# Patient Record
Sex: MALE | Race: WHITE | NOT HISPANIC OR LATINO | URBAN - METROPOLITAN AREA
[De-identification: names, ages, dates, MRNs, and addresses within clinical notes are randomized per-mention and may not be internally consistent; named-entity substitution may affect disease eponyms.]

---

## 2020-07-25 ENCOUNTER — TELEPHONE ENCOUNTER (OUTPATIENT)
Dept: URBAN - METROPOLITAN AREA CLINIC 13 | Facility: CLINIC | Age: 30
End: 2020-07-25

## 2020-07-25 RX ORDER — VARENICLINE TARTRATE 1 MG/1
TABLET, FILM COATED ORAL
Refills: 0 | OUTPATIENT
End: 2019-10-23

## 2020-07-25 RX ORDER — HYDROCORTISONE ACETATE 25 MG/1
SUPPOSITORY RECTAL
Refills: 0 | OUTPATIENT
End: 2019-10-23

## 2020-07-26 ENCOUNTER — TELEPHONE ENCOUNTER (OUTPATIENT)
Dept: URBAN - METROPOLITAN AREA CLINIC 13 | Facility: CLINIC | Age: 30
End: 2020-07-26

## 2020-07-26 RX ORDER — BISMUTH SUBSALICYLATE 262MG/15ML
TAKE 1 TABLET DAILY PRN SUSPENSION, ORAL (FINAL DOSE FORM) ORAL
Refills: 0 | Status: ACTIVE | COMMUNITY

## 2022-04-01 ENCOUNTER — OFFICE VISIT (OUTPATIENT)
Dept: URGENT CARE | Facility: CLINIC | Age: 32
End: 2022-04-01
Payer: COMMERCIAL

## 2022-04-01 ENCOUNTER — HOSPITAL ENCOUNTER (EMERGENCY)
Facility: HOSPITAL | Age: 32
Discharge: HOME | End: 2022-04-01
Attending: EMERGENCY MEDICINE
Payer: COMMERCIAL

## 2022-04-01 ENCOUNTER — APPOINTMENT (EMERGENCY)
Dept: CT IMAGING | Facility: HOSPITAL | Age: 32
End: 2022-04-01
Payer: COMMERCIAL

## 2022-04-01 VITALS
TEMPERATURE: 97.8 F | SYSTOLIC BLOOD PRESSURE: 114 MMHG | OXYGEN SATURATION: 99 % | HEIGHT: 66 IN | DIASTOLIC BLOOD PRESSURE: 68 MMHG | WEIGHT: 125 LBS | BODY MASS INDEX: 20.09 KG/M2 | RESPIRATION RATE: 18 BRPM | HEART RATE: 56 BPM

## 2022-04-01 VITALS
DIASTOLIC BLOOD PRESSURE: 77 MMHG | HEART RATE: 77 BPM | WEIGHT: 125.6 LBS | SYSTOLIC BLOOD PRESSURE: 116 MMHG | TEMPERATURE: 98 F | OXYGEN SATURATION: 98 % | RESPIRATION RATE: 20 BRPM

## 2022-04-01 DIAGNOSIS — R19.7 DIARRHEA OF PRESUMED INFECTIOUS ORIGIN: ICD-10-CM

## 2022-04-01 DIAGNOSIS — R19.7 DIARRHEA: Primary | ICD-10-CM

## 2022-04-01 DIAGNOSIS — R10.84 GENERALIZED ABDOMINAL PAIN: Primary | ICD-10-CM

## 2022-04-01 LAB
ALBUMIN SERPL BCP-MCNC: 3.6 G/DL (ref 3.5–5)
ALP SERPL-CCNC: 65 U/L (ref 46–116)
ALT SERPL W P-5'-P-CCNC: 26 U/L (ref 12–78)
ANION GAP SERPL CALCULATED.3IONS-SCNC: 5 MMOL/L (ref 4–13)
AST SERPL W P-5'-P-CCNC: 13 U/L (ref 5–45)
BASOPHILS # BLD AUTO: 0.02 THOUSANDS/ΜL (ref 0–0.1)
BASOPHILS NFR BLD AUTO: 1 % (ref 0–1)
BILIRUB SERPL-MCNC: 0.41 MG/DL (ref 0.2–1)
BUN SERPL-MCNC: 5 MG/DL (ref 5–25)
CALCIUM SERPL-MCNC: 8.5 MG/DL (ref 8.3–10.1)
CHLORIDE SERPL-SCNC: 103 MMOL/L (ref 100–108)
CO2 SERPL-SCNC: 30 MMOL/L (ref 21–32)
CREAT SERPL-MCNC: 0.94 MG/DL (ref 0.6–1.3)
CRP SERPL QL: 1.6 MG/L
EOSINOPHIL # BLD AUTO: 0.05 THOUSAND/ΜL (ref 0–0.61)
EOSINOPHIL NFR BLD AUTO: 1 % (ref 0–6)
ERYTHROCYTE [DISTWIDTH] IN BLOOD BY AUTOMATED COUNT: 12.8 % (ref 11.6–15.1)
GFR SERPL CREATININE-BSD FRML MDRD: 106 ML/MIN/1.73SQ M
GLUCOSE SERPL-MCNC: 96 MG/DL (ref 65–140)
HCT VFR BLD AUTO: 44.2 % (ref 36.5–49.3)
HGB BLD-MCNC: 14.9 G/DL (ref 12–17)
IMM GRANULOCYTES # BLD AUTO: 0.01 THOUSAND/UL (ref 0–0.2)
IMM GRANULOCYTES NFR BLD AUTO: 0 % (ref 0–2)
LACTATE SERPL-SCNC: 1 MMOL/L (ref 0.5–2)
LIPASE SERPL-CCNC: 31 U/L (ref 73–393)
LYMPHOCYTES # BLD AUTO: 1.51 THOUSANDS/ΜL (ref 0.6–4.47)
LYMPHOCYTES NFR BLD AUTO: 34 % (ref 14–44)
MAGNESIUM SERPL-MCNC: 1.9 MG/DL (ref 1.6–2.6)
MCH RBC QN AUTO: 29.4 PG (ref 26.8–34.3)
MCHC RBC AUTO-ENTMCNC: 33.7 G/DL (ref 31.4–37.4)
MCV RBC AUTO: 87 FL (ref 82–98)
MONOCYTES # BLD AUTO: 0.95 THOUSAND/ΜL (ref 0.17–1.22)
MONOCYTES NFR BLD AUTO: 22 % (ref 4–12)
NEUTROPHILS # BLD AUTO: 1.85 THOUSANDS/ΜL (ref 1.85–7.62)
NEUTS SEG NFR BLD AUTO: 42 % (ref 43–75)
NRBC BLD AUTO-RTO: 0 /100 WBCS
PLATELET # BLD AUTO: 270 THOUSANDS/UL (ref 149–390)
PMV BLD AUTO: 9.3 FL (ref 8.9–12.7)
POTASSIUM SERPL-SCNC: 4.4 MMOL/L (ref 3.5–5.3)
PROT SERPL-MCNC: 6.9 G/DL (ref 6.4–8.2)
RBC # BLD AUTO: 5.07 MILLION/UL (ref 3.88–5.62)
SODIUM SERPL-SCNC: 138 MMOL/L (ref 136–145)
WBC # BLD AUTO: 4.39 THOUSAND/UL (ref 4.31–10.16)

## 2022-04-01 PROCEDURE — 36415 COLL VENOUS BLD VENIPUNCTURE: CPT | Performed by: PHYSICIAN ASSISTANT

## 2022-04-01 PROCEDURE — 83690 ASSAY OF LIPASE: CPT | Performed by: PHYSICIAN ASSISTANT

## 2022-04-01 PROCEDURE — 99284 EMERGENCY DEPT VISIT MOD MDM: CPT

## 2022-04-01 PROCEDURE — 83605 ASSAY OF LACTIC ACID: CPT | Performed by: PHYSICIAN ASSISTANT

## 2022-04-01 PROCEDURE — 99284 EMERGENCY DEPT VISIT MOD MDM: CPT | Performed by: PHYSICIAN ASSISTANT

## 2022-04-01 PROCEDURE — 99203 OFFICE O/P NEW LOW 30 MIN: CPT | Performed by: NURSE PRACTITIONER

## 2022-04-01 PROCEDURE — 96365 THER/PROPH/DIAG IV INF INIT: CPT

## 2022-04-01 PROCEDURE — 86140 C-REACTIVE PROTEIN: CPT | Performed by: PHYSICIAN ASSISTANT

## 2022-04-01 PROCEDURE — 85025 COMPLETE CBC W/AUTO DIFF WBC: CPT | Performed by: PHYSICIAN ASSISTANT

## 2022-04-01 PROCEDURE — 96375 TX/PRO/DX INJ NEW DRUG ADDON: CPT

## 2022-04-01 PROCEDURE — 80053 COMPREHEN METABOLIC PANEL: CPT | Performed by: PHYSICIAN ASSISTANT

## 2022-04-01 PROCEDURE — 83735 ASSAY OF MAGNESIUM: CPT | Performed by: PHYSICIAN ASSISTANT

## 2022-04-01 RX ORDER — ONDANSETRON 2 MG/ML
4 INJECTION INTRAMUSCULAR; INTRAVENOUS ONCE
Status: COMPLETED | OUTPATIENT
Start: 2022-04-01 | End: 2022-04-01

## 2022-04-01 RX ORDER — KETOROLAC TROMETHAMINE 30 MG/ML
15 INJECTION, SOLUTION INTRAMUSCULAR; INTRAVENOUS ONCE
Status: COMPLETED | OUTPATIENT
Start: 2022-04-01 | End: 2022-04-01

## 2022-04-01 RX ORDER — DICYCLOMINE HCL 20 MG
20 TABLET ORAL ONCE
Status: COMPLETED | OUTPATIENT
Start: 2022-04-01 | End: 2022-04-01

## 2022-04-01 RX ADMIN — SODIUM CHLORIDE, SODIUM LACTATE, POTASSIUM CHLORIDE, AND CALCIUM CHLORIDE 2000 ML: .6; .31; .03; .02 INJECTION, SOLUTION INTRAVENOUS at 12:52

## 2022-04-01 RX ADMIN — ONDANSETRON 4 MG: 2 INJECTION INTRAMUSCULAR; INTRAVENOUS at 12:53

## 2022-04-01 RX ADMIN — DICYCLOMINE HYDROCHLORIDE 20 MG: 20 TABLET ORAL at 12:53

## 2022-04-01 RX ADMIN — KETOROLAC TROMETHAMINE 15 MG: 30 INJECTION, SOLUTION INTRAMUSCULAR at 12:53

## 2022-04-01 NOTE — PATIENT INSTRUCTIONS
You have been instructed to go to the ED for evaluation of your abdominal pain and diarrhea  Do not eat, drink, or urinate until you get to the ED  Follow up with your PCP

## 2022-04-01 NOTE — DISCHARGE INSTRUCTIONS
Rest, plenty of fluids  Clear liquids and advance as diet  Recommend daily probiotic such as culturelle, align, etc   OTC immodium as needed for diarrhea  Follow up with GI or return to ER as needed

## 2022-04-01 NOTE — PROGRESS NOTES
[facility]        NAME: Alirio Santos is a 27 y o  male  : 1990    MRN: [id number]  DATE: 2022  TIME: 11:27 AM    Assessment and Plan   Generalized abdominal pain [R10 84]  1  Generalized abdominal pain  Transfer to other facility   2  Diarrhea of presumed infectious origin  Transfer to other facility         Patient Instructions       Follow up with PCP in 3-5 days  Proceed to ER if symptoms worsen  You have been instructed to go to the ED for evaluation of your abdominal pain and diarrhea  Do not eat, drink, or urinate until you get to the ED  Follow up with your PCP  Chief Complaint     Chief Complaint   Patient presents with    Diarrhea    Abdominal Pain         History of Present Illness       This is a 27year old male with no personal history of Chron's, UC, or diverticulitis who presents with 1 week of abdominal pain and diarrhea  Believes it was from eating hotdogs last Friday  Reports bowel movements are liquid brown and dark red stool and frequent, "I lost count of how many times today " With associated chills, nausea, and weight loss  Denies fevers and vomiting  One void today and reports decreased number of voids throughout the last week  Yesterday ate bland chicken with potatoes only, no solid PO intake today  Is attempting to take extra vitamin supplements and is drinking water and Pedialyte  Pt states last time he had this he was in [place] and was given "steroids and antibiotics"  He denies having any testing done at that time  Pt told RN that he "lost 5 pounds in 1 month"       Review of Systems   Review of Systems   Constitutional: Positive for appetite change, chills and fatigue  Negative for fever  HENT: Negative  Eyes: Negative  Respiratory: Negative  Cardiovascular: Negative  Gastrointestinal: Positive for abdominal pain, blood in stool, diarrhea and nausea  Negative for abdominal distention and vomiting  Endocrine: Negative  Genitourinary: Positive for decreased urine volume  Musculoskeletal: Negative  Skin: Negative  Allergic/Immunologic: Negative  Neurological: Negative  Hematological: Negative  Psychiatric/Behavioral: Negative  Current Medications     No current outpatient medications on file  Current Allergies     Allergies as of 04/01/2022    (No Known Allergies)            The following portions of the patient's history were reviewed and updated as appropriate: allergies, current medications, past family history, past medical history, past social history, past surgical history and problem list      History reviewed  No pertinent past medical history  History reviewed  No pertinent surgical history  History reviewed  No pertinent family history  Medications have been verified  Objective   /77   Pulse 77   Temp 98 °F (36 7 °C)   Resp 20   Wt 57 kg (125 lb 9 6 oz)   SpO2 98%   No LMP for male patient  Physical Exam     Physical Exam  Vitals and nursing note reviewed  Constitutional:       Appearance: He is ill-appearing  He is not diaphoretic  Comments: Thin, frail appearing     HENT:      Head: Normocephalic and atraumatic  Eyes:      Extraocular Movements: Extraocular movements intact  Pupils: Pupils are equal, round, and reactive to light  Cardiovascular:      Rate and Rhythm: Normal rate and regular rhythm  Heart sounds: Normal heart sounds  No murmur heard  Pulmonary:      Effort: Pulmonary effort is normal  No respiratory distress  Breath sounds: Normal breath sounds  Abdominal:      General: Abdomen is flat  Bowel sounds are increased  There is no distension  Palpations: Abdomen is soft  There is no mass  Tenderness: There is generalized abdominal tenderness and tenderness in the left lower quadrant  Comments: Fluid palpated in LLQ    Skin:     General: Skin is warm and dry        Capillary Refill: Capillary refill takes 2 to 3 seconds  Coloration: Skin is pale  Neurological:      General: No focal deficit present  Mental Status: He is alert and oriented to person, place, and time  Psychiatric:         Mood and Affect: Mood normal          Behavior: Behavior normal  Behavior is cooperative

## 2022-04-01 NOTE — ED NOTES
Callbell within reach  Bed in low position  Siderails up  HOB elevated  Patient resting comfortably at this time        Riddhi Martínez, [facility]  04/01/22 3837

## 2022-04-01 NOTE — ED PROVIDER NOTES
History  Chief Complaint   Patient presents with    Diarrhea     patient reports one week of diarrhea and mid abdominal pain  drinking plenty of fluids at home     27year old otherwise healthy male presents ambulatory from local urgent care for evaluation  Pt reports symptoms started a week ago  Has been having generalized abdominal pain  He also reports numerous episode of watery diarrhea  Lost track of number of episodes  Recently noticed some bright red blood in the stool  Initially when symptoms started thought it was from hot dogs he ate  Symptoms not getting better  Reports chills, nausea  Denies vomiting  Appetite decreased  Has been trying to push fluids and pedialyte  Denies cough or congestion  No recent antibiotic use  Denies recent travel  Denies chest pain, SOB  Denies any urinary complaints but feels he is not urinating as much  No rashes reported  No reported aggravating or alleviating factors  No specific treatments tried  No prior abdominal surgeries  Was seen at urgent care earlier today and referred to us for further evaluation  He tells me he had something similar about 6 months ago while in [place] and was given antibiotics and steroids at that time with improvement of his symptoms  No testing was done at that time  He denies any history of Crohn's, ulcerative colitis or diverticulitis  History provided by:  Patient and medical records   used: No    Diarrhea  Timing:  Intermittent  Relieved by:  Nothing  Ineffective treatments:  None tried  Associated symptoms: abdominal pain, chills and myalgias    Associated symptoms: no fever, no headaches and no vomiting    Risk factors: no recent antibiotic use, no sick contacts, no suspicious food intake and no travel to endemic areas        None       History reviewed  No pertinent past medical history  History reviewed  No pertinent surgical history  History reviewed   No pertinent family history  I have reviewed and agree with the history as documented  E-Cigarette/Vaping    E-Cigarette Use Current Every Day User      E-Cigarette/Vaping Substances    Nicotine Yes      Social History     Tobacco Use    Smoking status: Current Some Day Smoker     Types: Cigarettes    Smokeless tobacco: Never Used   Vaping Use    Vaping Use: Every day    Substances: Nicotine   Substance Use Topics    Alcohol use: Never    Drug use: Never       Review of Systems   Constitutional: Positive for appetite change, chills and fatigue  Negative for fever  HENT: Negative  Negative for congestion, rhinorrhea and sore throat  Eyes: Negative  Negative for visual disturbance  Respiratory: Negative  Negative for cough, shortness of breath and wheezing  Cardiovascular: Negative  Negative for chest pain  Gastrointestinal: Positive for abdominal pain, blood in stool, diarrhea and nausea  Negative for constipation and vomiting  Genitourinary: Negative  Negative for dysuria, flank pain, frequency and hematuria  Musculoskeletal: Positive for myalgias  Negative for back pain and neck pain  Skin: Negative  Negative for rash  Neurological: Negative  Negative for dizziness, light-headedness and headaches  Psychiatric/Behavioral: Negative  All other systems reviewed and are negative  Physical Exam  Physical Exam  Vitals and nursing note reviewed  Constitutional:       General: He is not in acute distress  Appearance: Normal appearance  He is well-developed  He is not toxic-appearing or diaphoretic  HENT:      Head: Normocephalic and atraumatic  Right Ear: Hearing and external ear normal       Left Ear: Hearing and external ear normal       Nose: Nose normal       Mouth/Throat:      Mouth: Mucous membranes are moist       Tongue: Tongue does not deviate from midline  Pharynx: Oropharynx is clear  Uvula midline  Eyes:      General: Lids are normal  No scleral icterus  Extraocular Movements: Extraocular movements intact  Conjunctiva/sclera: Conjunctivae normal    Neck:      Trachea: Trachea and phonation normal  No tracheal deviation  Cardiovascular:      Rate and Rhythm: Normal rate and regular rhythm  Pulses: Normal pulses  Heart sounds: Normal heart sounds, S1 normal and S2 normal  No murmur heard  Pulmonary:      Effort: Pulmonary effort is normal  No tachypnea or respiratory distress  Breath sounds: Normal breath sounds  No wheezing, rhonchi or rales  Abdominal:      General: Bowel sounds are increased  There is no distension  Palpations: Abdomen is soft  Tenderness: There is no abdominal tenderness  There is no right CVA tenderness, left CVA tenderness, guarding or rebound  Hernia: No hernia is present  Musculoskeletal:         General: No tenderness  Normal range of motion  Right lower leg: No edema  Left lower leg: No edema  Skin:     General: Skin is warm and dry  Capillary Refill: Capillary refill takes less than 2 seconds  Findings: No rash  Neurological:      General: No focal deficit present  Mental Status: He is alert and oriented to person, place, and time  GCS: GCS eye subscore is 4  GCS verbal subscore is 5  GCS motor subscore is 6  Motor: No abnormal muscle tone        Gait: Gait normal    Psychiatric:         Mood and Affect: Mood normal          Speech: Speech normal          Behavior: Behavior normal          Vital Signs  ED Triage Vitals [04/01/22 1150]   Temperature Pulse Respirations Blood Pressure SpO2   97 8 °F (36 6 °C) 96 16 111/76 97 %      Temp Source Heart Rate Source Patient Position - Orthostatic VS BP Location FiO2 (%)   Temporal Monitor Sitting Left arm --      Pain Score       No Pain           Vitals:    04/01/22 1200 04/01/22 1230 04/01/22 1330 04/01/22 1400   BP: 118/76 113/73 125/70 114/68   Pulse: 93 58 59 56   Patient Position - Orthostatic VS:  Lying Lying Lying         Visual Acuity  Visual Acuity      Most Recent Value   L Pupil Size (mm) 3   R Pupil Size (mm) 3          ED Medications  Medications   ondansetron (ZOFRAN) injection 4 mg (4 mg Intravenous Given 4/1/22 1253)   ketorolac (TORADOL) injection 15 mg (15 mg Intravenous Given 4/1/22 1253)   dicyclomine (BENTYL) tablet 20 mg (20 mg Oral Given 4/1/22 1253)   lactated ringers bolus 2,000 mL (0 mL Intravenous Stopped 4/1/22 1352)       Diagnostic Studies  Results Reviewed     Procedure Component Value Units Date/Time    Lactic acid [[id number]]  (Normal) Collected: 04/01/22 1251    Lab Status: Final result Specimen: Blood from Arm, Right Updated: 04/01/22 1332     LACTIC ACID 1 0 mmol/L     Narrative:      Result may be elevated if tourniquet was used during collection      Lipase [[id number]]  (Abnormal) Collected: 04/01/22 1251    Lab Status: Final result Specimen: Blood from Arm, Right Updated: 04/01/22 1319     Lipase 31 u/L     Comprehensive metabolic panel [[id number]] Collected: 04/01/22 1251    Lab Status: Final result Specimen: Blood from Arm, Right Updated: 04/01/22 1319     Sodium 138 mmol/L      Potassium 4 4 mmol/L      Chloride 103 mmol/L      CO2 30 mmol/L      ANION GAP 5 mmol/L      BUN 5 mg/dL      Creatinine 0 94 mg/dL      Glucose 96 mg/dL      Calcium 8 5 mg/dL      AST 13 U/L      ALT 26 U/L      Alkaline Phosphatase 65 U/L      Total Protein 6 9 g/dL      Albumin 3 6 g/dL      Total Bilirubin 0 41 mg/dL      eGFR 106 ml/min/1 73sq m     Narrative:      Ede guidelines for Chronic Kidney Disease (CKD):     Stage 1 with normal or high GFR (GFR > 90 mL/min/1 73 square meters)    Stage 2 Mild CKD (GFR = 60-89 mL/min/1 73 square meters)    Stage 3A Moderate CKD (GFR = 45-59 mL/min/1 73 square meters)    Stage 3B Moderate CKD (GFR = 30-44 mL/min/1 73 square meters)    Stage 4 Severe CKD (GFR = 15-29 mL/min/1 73 square meters)    Stage 5 End Stage CKD (GFR <15 mL/min/1 73 square meters)  Note: GFR calculation is accurate only with a steady state creatinine    Magnesium [[id number]]  (Normal) Collected: 04/01/22 1251    Lab Status: Final result Specimen: Blood from Arm, Right Updated: 04/01/22 1319     Magnesium 1 9 mg/dL     C-reactive protein [[id number]]  (Normal) Collected: 04/01/22 1251    Lab Status: Final result Specimen: Blood from Arm, Right Updated: 04/01/22 1319     CRP 1 6 mg/L     Narrative:      Note: Unit of Measure change to mg/L at [facility] will show at 10 fold increase in CRP result to match network standards      CBC and differential [[id number]]  (Abnormal) Collected: 04/01/22 1251    Lab Status: Final result Specimen: Blood from Arm, Right Updated: 04/01/22 1306     WBC 4 39 Thousand/uL      RBC 5 07 Million/uL      Hemoglobin 14 9 g/dL      Hematocrit 44 2 %      MCV 87 fL      MCH 29 4 pg      MCHC 33 7 g/dL      RDW 12 8 %      MPV 9 3 fL      Platelets 352 Thousands/uL      nRBC 0 /100 WBCs      Neutrophils Relative 42 %      Immat GRANS % 0 %      Lymphocytes Relative 34 %      Monocytes Relative 22 %      Eosinophils Relative 1 %      Basophils Relative 1 %      Neutrophils Absolute 1 85 Thousands/µL      Immature Grans Absolute 0 01 Thousand/uL      Lymphocytes Absolute 1 51 Thousands/µL      Monocytes Absolute 0 95 Thousand/µL      Eosinophils Absolute 0 05 Thousand/µL      Basophils Absolute 0 02 Thousands/µL                  No orders to display              Procedures  Procedures         ED Course  ED Course as of 04/01/22 1618   Fri Apr 01, 2022   1311 WBC: 4 39   1311 Hemoglobin: 14 9   1311 Platelet Count: 981   1320 Glucose, Random: 96   1320 Creatinine: 0 94   1320 BUN: 5   1320 Sodium: 138   1320 Potassium: 4 4   1320 Chloride: 103   1320 CO2: 30   1320 Anion Gap: 5   1320 Calcium: 8 5   1320 AST: 13   1320 ALT: 26   1320 Alkaline Phosphatase: 65   1320 Total Protein: 6 9   1320 Albumin: 3 6   1320 TOTAL BILIRUBIN: 0 41   1320 eGFR: 106   1320 Lipase(!): 31   1320 Magnesium: 1 9   1320 C-REACTIVE PROTEIN: 1 6   1348 LACTIC ACID: 1 0   1355 Pt does not want CT scan due to cost concerns  Reviewed labs  Vitals stable  No significant abdominal tenderness  Pt would like to be discharged and forgo CT scanning  This does not appear unreasonable  Will discharge with outpatient GI follow up  Pt afebrile, hemodynamically stable  No significant abdominal tenderness  labs unremarkable  He is tolerating PO  Pt wished to defer any abdominal imaging  Discussed symptoms could be potentially be related to a GI virus vs food borne illness  However given recurrent nature of symptoms, would recommend outpatient follow up with GI  Advised to push plenty of fluids  Recommend an electrolyte solution such as gatorade or pedialyte  BRAT (bananas, rice, apples, toast) diet and advance as tolerated  OTC tylenol and ibuprofen as needed for fever/discomfort  Recommended daily probiotic  OTC immodium as needed for diarrhea  Strict return precautions outlined  Advised outpatient follow up with PCP and GI or return to ER for change in condition as outlined  Pt verbalized understanding and had no further questions  SBIRT 20yo+      Most Recent Value   SBIRT (22 yo +)    In order to provide better care to our patients, we are screening all of our patients for alcohol and drug use  Would it be okay to ask you these screening questions? Yes Filed at: 04/01/2022 1212   Initial Alcohol Screen: US AUDIT-C     1  How often do you have a drink containing alcohol? 0 Filed at: 04/01/2022 1212   2  How many drinks containing alcohol do you have on a typical day you are drinking? 0 Filed at: 04/01/2022 1212   3a  Male UNDER 65: How often do you have five or more drinks on one occasion? 0 Filed at: 04/01/2022 1212   3b  FEMALE Any Age, or MALE 65+: How often do you have 4 or more drinks on one occassion?  0 Filed at: 04/01/2022 1212 Audit-C Score 0 Filed at: 04/01/2022 1212   SURESH: How many times in the past year have you    Used an illegal drug or used a prescription medication for non-medical reasons? Never Filed at: 04/01/2022 1212                    [facility]  Number of Diagnoses or Management Options  Diarrhea: new and requires workup     Amount and/or Complexity of Data Reviewed  Clinical lab tests: ordered and reviewed  Tests in the radiology section of CPT®: (Pt refused)  Decide to obtain previous medical records or to obtain history from someone other than the patient: yes  Obtain history from someone other than the patient: yes  Review and summarize past medical records: yes    Patient Progress  Patient progress: improved      Disposition  Final diagnoses:   Diarrhea     Time reflects when diagnosis was documented in both MDM as applicable and the Disposition within this note     Time User Action Codes Description Comment    4/1/2022  2:01 PM Igor Good Add [R19 7] Diarrhea       ED Disposition     ED Disposition Condition Date/Time Comment    Discharge Stable Fri Apr 1, 2022  2:01 PM Shay Hannah discharge to home/self care  Follow-up Information     Follow up With Specialties Details Why Contact Info Additional [facility] Schedule an appointment as soon as possible for a visit   [address] [id number]  Kiko Kelly [facility] [address], [place], [place], [address] [facility] Emergency Medicine  As needed [phone]  [facility] Department, [address], [address]          There are no discharge medications for this patient  No discharge procedures on file      PDMP Review     None          ED Provider  Electronically Signed by           Jas Ruiz PA-C  04/01/22 0474

## 2022-04-11 RX ORDER — DICYCLOMINE HYDROCHLORIDE 10 MG/1
10 CAPSULE ORAL
Qty: 20 CAPSULE | Refills: 0 | Status: SHIPPED | OUTPATIENT
Start: 2022-04-11 | End: 2022-07-21

## 2022-04-20 ENCOUNTER — TELEPHONE (OUTPATIENT)
Dept: SURGERY | Facility: CLINIC | Age: 32
End: 2022-04-20

## 2022-04-20 NOTE — TELEPHONE ENCOUNTER
Patient came into office requesting an appointment for persistent diarrhea, moved his appointment up from 5/20/2022 to tomorrow  Patient is aware of this  Advised patient that if symptoms worsen he should go to the ED today and not wait until appointment tomorrow  He wanted to know if we could order testing, advised him unfortunately since he is not established with our office we can't order until he is seen for an evaluation  He verbalized understanding and had no more questions at this time

## 2022-04-21 ENCOUNTER — OFFICE VISIT (OUTPATIENT)
Dept: GASTROENTEROLOGY | Facility: CLINIC | Age: 32
End: 2022-04-21
Payer: COMMERCIAL

## 2022-04-21 VITALS
TEMPERATURE: 98.2 F | RESPIRATION RATE: 20 BRPM | OXYGEN SATURATION: 98 % | WEIGHT: 124 LBS | HEART RATE: 98 BPM | DIASTOLIC BLOOD PRESSURE: 68 MMHG | BODY MASS INDEX: 19.93 KG/M2 | HEIGHT: 66 IN | SYSTOLIC BLOOD PRESSURE: 122 MMHG

## 2022-04-21 DIAGNOSIS — R10.84 GENERALIZED ABDOMINAL PAIN: ICD-10-CM

## 2022-04-21 DIAGNOSIS — R19.7 DIARRHEA, UNSPECIFIED TYPE: Primary | ICD-10-CM

## 2022-04-21 DIAGNOSIS — K62.5 RECTAL BLEEDING: ICD-10-CM

## 2022-04-21 PROCEDURE — 99244 OFF/OP CNSLTJ NEW/EST MOD 40: CPT | Performed by: FAMILY MEDICINE

## 2022-04-21 NOTE — PROGRESS NOTES
[facility] - Outpatient Consultation  Loly Strickland 27 y o  male MRN: [id number]  Encounter: [id number]          ASSESSMENT AND PLAN:      1  Generalized abdominal pain  2  Diarrhea, unspecified type  3  Rectal bleeding  Patient seen in ED on 04/01/2022 for generalized abdominal pain and diarrhea  Workup, physical exam, and vitals were unremarkable and patient refused abdominal imaging due to cost with being self-pay  Has since had progressively worsening diarrhea, now associated with rectal bleeding described as bright red blood/clots and an 8 lb weight loss x1 month - Although, per chart review weight has remained stable  Discussed multiple etiologies for symptoms including infectious colitis, autoimmune colitis, celiac disease, infection with H pylori, thyroid abnormalities, or less likely malignancy - Ordered serologies and stool studies as referenced below, and recommended he have these completed as soon as possible - If labs are too costly recommended prioritizing infectious studies  Less suspicious for diverticulitis given chronicity of symptoms, generalized abd pain, diarrhea with rectal bleeding, previously normal WBC count, and remaining afebrile/non-toxic appearing  Discussed abdominal imaging vs colonoscopy for further evaluation given patient being self-pay - Ultimately recommended colonoscopy, particularly for rectal bleeding, however this may be difficult to schedule due to financial restraints  He has been provided information regarding financial assistance through [facility]  Discussed risks of procedure with patient including, but not limited to, bleeding, infection, and perforation; Patient gave verbal understanding and is agreeable to proceed  Discussed and given instructions for bowel prep as ordered - MiraLax/Dulcolax      If lab/stool studies are unremarkable, would consider prescription for Lomotil given no improvement in symptoms with OTC antidiarrheals but would prefer patient undergo colonoscopy to r/o autoimmune colitis prior to taking antidiarrheals  - Calprotectin,Fecal; Future  - Clostridium difficile toxin by PCR with EIA; Future  - Giardia antigen; Future  - H  pylori antigen, stool; Future  - Ova and parasite examination; Future  - IgA; Future  - Tissue transglutaminase, IgA; Future  - Stool Enteric Bacterial Panel by PCR; Future  - TSH w/Reflex; Future  - CBC and differential; Future  - Comprehensive metabolic panel; Future  - Colonoscopy; Future    Follow-up after procedure to discuss results  ______________________________________________________________________    HPI: Patient is a 27 y o  male without significant PMH who presents today for a consultation regarding diarrhea  Referred after d/c from the ED  Patient was initially seen in the urgent care on 04/01/2022 for generalized abdominal pain and diarrhea  Patient was advised to proceed to the ED for further evaluation  Labs including CBC, CMP, lipase, magnesium, CRP, lactic acid were entirely unremarkable  Patient refused abdominal imaging, due to cost  Patient was advised that symptoms may be due to gastroenteritis versus food-borne illness, and was successfully discharged with recommendations to follow-up with Gastroenterology  Today, patient states that sxs's started with acute onset of diarrhea one month prior  States his diarrhea progressed, and is now having a BM every 3 hours throughout the night and occasionally throughout the day  Described his stools as liquid brown stools with passage of both bright red blood and clots  Describes abdominal pain as generalized abdominal cramping prior to, and improved with, bowel movements  Admits to seeing blood with every BM  Also admits to unintentional weight loss of 8 lbs of the last month - Although per chart review patient's weight appears stable since 4/1  Has tried Imodium without any improvement   Denies objective fever/chills, nausea/vomting, or constipation  Denies family history of autoimmune colitis  Denies recent travel outside the country, recent antibiotic use, eating undercooked/poorly prepared foods, or sick contacts  REVIEW OF SYSTEMS:    CONSTITUTIONAL: Denies any fever, chills, rigors, and weight loss  HEENT: No earache or tinnitus  Denies hearing loss or visual disturbances  CARDIOVASCULAR: No chest pain or palpitations  RESPIRATORY: Denies any cough, hemoptysis, shortness of breath or dyspnea on exertion  GASTROINTESTINAL: As noted in the History of Present Illness  GENITOURINARY: No problems with urination  Denies any hematuria or dysuria  NEUROLOGIC: No dizziness or vertigo, denies headaches  MUSCULOSKELETAL: Denies any muscle or joint pain  SKIN: Denies skin rashes or itching  ENDOCRINE: Denies excessive thirst  Denies intolerance to heat or cold  PSYCHOSOCIAL: Denies depression or anxiety  Denies any recent memory loss  Historical Information   No past medical history on file  No past surgical history on file  Social History   Social History     Substance and Sexual Activity   Alcohol Use Never     Social History     Substance and Sexual Activity   Drug Use Never     Social History     Tobacco Use   Smoking Status Current Some Day Smoker    Types: Cigarettes   Smokeless Tobacco Never Used     No family history on file  Meds/Allergies       Current Outpatient Medications:     dicyclomine (BENTYL) 10 mg capsule    No Known Allergies        Objective     Blood pressure 122/68, pulse 98, temperature 98 2 °F (36 8 °C), resp  rate 20, height 5' 6" (1 676 m), weight 56 2 kg (124 lb), SpO2 98 %  Body mass index is 20 01 kg/m²  PHYSICAL EXAM:      General Appearance:   Alert, cooperative, no distress   HEENT:   Normocephalic, atraumatic, anicteric       Neck:  Supple, symmetrical, trachea midline   Lungs:   Clear to auscultation bilaterally; no rales, rhonchi or wheezing; respirations unlabored Heart[de-identified]   Regular rate and rhythm; no murmur, rub, or gallop  Abdomen:   Soft, +generalized abdominal TTP, non-distended; normal bowel sounds; no masses, no organomegaly    Genitalia:   Deferred    Rectal:   Deferred    Extremities:  No cyanosis, clubbing or edema    Pulses:  2+ and symmetric    Skin:  No jaundice, rashes, or lesions    Lymph nodes:  No palpable cervical lymphadenopathy        Lab Results:   No visits with results within 1 Day(s) from this visit     Latest known visit with results is:   Admission on 04/01/2022, Discharged on 04/01/2022   Component Date Value    WBC 04/01/2022 4 39     RBC 04/01/2022 5 07     Hemoglobin 04/01/2022 14 9     Hematocrit 04/01/2022 44 2     MCV 04/01/2022 87     MCH 04/01/2022 29 4     MCHC 04/01/2022 33 7     RDW 04/01/2022 12 8     MPV 04/01/2022 9 3     Platelets 48/76/1812 270     nRBC 04/01/2022 0     Neutrophils Relative 04/01/2022 42*    Immat GRANS % 04/01/2022 0     Lymphocytes Relative 04/01/2022 34     Monocytes Relative 04/01/2022 22*    Eosinophils Relative 04/01/2022 1     Basophils Relative 04/01/2022 1     Neutrophils Absolute 04/01/2022 1 85     Immature Grans Absolute 04/01/2022 0 01     Lymphocytes Absolute 04/01/2022 1 51     Monocytes Absolute 04/01/2022 0 95     Eosinophils Absolute 04/01/2022 0 05     Basophils Absolute 04/01/2022 0 02     Lipase 04/01/2022 31*    Sodium 04/01/2022 138     Potassium 04/01/2022 4 4     Chloride 04/01/2022 103     CO2 04/01/2022 30     ANION GAP 04/01/2022 5     BUN 04/01/2022 5     Creatinine 04/01/2022 0 94     Glucose 04/01/2022 96     Calcium 04/01/2022 8 5     AST 04/01/2022 13     ALT 04/01/2022 26     Alkaline Phosphatase 04/01/2022 65     Total Protein 04/01/2022 6 9     Albumin 04/01/2022 3 6     Total Bilirubin 04/01/2022 0 41     eGFR 04/01/2022 106     Magnesium 04/01/2022 1 9     CRP 04/01/2022 1 6     LACTIC ACID 04/01/2022 1 0          Radiology Results: No results found

## 2022-04-22 ENCOUNTER — APPOINTMENT (OUTPATIENT)
Dept: LAB | Facility: CLINIC | Age: 32
End: 2022-04-22

## 2022-04-22 DIAGNOSIS — R19.7 DIARRHEA, UNSPECIFIED TYPE: ICD-10-CM

## 2022-04-22 DIAGNOSIS — K62.5 RECTAL BLEEDING: ICD-10-CM

## 2022-04-22 LAB
ALBUMIN SERPL BCP-MCNC: 2.6 G/DL (ref 3.5–5)
ALP SERPL-CCNC: 64 U/L (ref 46–116)
ALT SERPL W P-5'-P-CCNC: 36 U/L (ref 12–78)
ANION GAP SERPL CALCULATED.3IONS-SCNC: 2 MMOL/L (ref 4–13)
AST SERPL W P-5'-P-CCNC: 20 U/L (ref 5–45)
BASOPHILS # BLD AUTO: 0.12 THOUSANDS/ΜL (ref 0–0.1)
BASOPHILS NFR BLD AUTO: 1 % (ref 0–1)
BILIRUB SERPL-MCNC: 0.26 MG/DL (ref 0.2–1)
BUN SERPL-MCNC: 8 MG/DL (ref 5–25)
CALCIUM ALBUM COR SERPL-MCNC: 9.5 MG/DL (ref 8.3–10.1)
CALCIUM SERPL-MCNC: 8.4 MG/DL (ref 8.3–10.1)
CHLORIDE SERPL-SCNC: 107 MMOL/L (ref 100–108)
CO2 SERPL-SCNC: 30 MMOL/L (ref 21–32)
CREAT SERPL-MCNC: 0.85 MG/DL (ref 0.6–1.3)
EOSINOPHIL # BLD AUTO: 1.41 THOUSAND/ΜL (ref 0–0.61)
EOSINOPHIL NFR BLD AUTO: 10 % (ref 0–6)
ERYTHROCYTE [DISTWIDTH] IN BLOOD BY AUTOMATED COUNT: 13.9 % (ref 11.6–15.1)
GFR SERPL CREATININE-BSD FRML MDRD: 115 ML/MIN/1.73SQ M
GLUCOSE P FAST SERPL-MCNC: 91 MG/DL (ref 65–99)
HCT VFR BLD AUTO: 34.7 % (ref 36.5–49.3)
HGB BLD-MCNC: 11 G/DL (ref 12–17)
IGA SERPL-MCNC: 153 MG/DL (ref 70–400)
IMM GRANULOCYTES # BLD AUTO: 0.2 THOUSAND/UL (ref 0–0.2)
IMM GRANULOCYTES NFR BLD AUTO: 1 % (ref 0–2)
LYMPHOCYTES # BLD AUTO: 2.07 THOUSANDS/ΜL (ref 0.6–4.47)
LYMPHOCYTES NFR BLD AUTO: 15 % (ref 14–44)
MCH RBC QN AUTO: 28.4 PG (ref 26.8–34.3)
MCHC RBC AUTO-ENTMCNC: 31.7 G/DL (ref 31.4–37.4)
MCV RBC AUTO: 90 FL (ref 82–98)
MONOCYTES # BLD AUTO: 1.37 THOUSAND/ΜL (ref 0.17–1.22)
MONOCYTES NFR BLD AUTO: 10 % (ref 4–12)
NEUTROPHILS # BLD AUTO: 8.84 THOUSANDS/ΜL (ref 1.85–7.62)
NEUTS SEG NFR BLD AUTO: 63 % (ref 43–75)
NRBC BLD AUTO-RTO: 0 /100 WBCS
PLATELET # BLD AUTO: 567 THOUSANDS/UL (ref 149–390)
PMV BLD AUTO: 8.7 FL (ref 8.9–12.7)
POTASSIUM SERPL-SCNC: 4.3 MMOL/L (ref 3.5–5.3)
PROT SERPL-MCNC: 5.9 G/DL (ref 6.4–8.2)
RBC # BLD AUTO: 3.87 MILLION/UL (ref 3.88–5.62)
SODIUM SERPL-SCNC: 139 MMOL/L (ref 136–145)
TSH SERPL DL<=0.05 MIU/L-ACNC: 1.46 UIU/ML (ref 0.45–4.5)
WBC # BLD AUTO: 14.01 THOUSAND/UL (ref 4.31–10.16)

## 2022-04-22 PROCEDURE — 85025 COMPLETE CBC W/AUTO DIFF WBC: CPT

## 2022-04-22 PROCEDURE — 86364 TISS TRNSGLTMNASE EA IG CLAS: CPT

## 2022-04-22 PROCEDURE — 84443 ASSAY THYROID STIM HORMONE: CPT

## 2022-04-22 PROCEDURE — 80053 COMPREHEN METABOLIC PANEL: CPT

## 2022-04-22 PROCEDURE — 36415 COLL VENOUS BLD VENIPUNCTURE: CPT

## 2022-04-22 PROCEDURE — 82784 ASSAY IGA/IGD/IGG/IGM EACH: CPT

## 2022-04-23 LAB — TTG IGA SER-ACNC: <2 U/ML (ref 0–3)

## 2022-04-27 ENCOUNTER — TELEPHONE (OUTPATIENT)
Dept: OTHER | Facility: OTHER | Age: 32
End: 2022-04-27

## 2022-04-27 NOTE — TELEPHONE ENCOUNTER
Patient called regarding his results of his blood work  He has been waiting for a call from provider      Please give him a call back

## 2022-04-27 NOTE — TELEPHONE ENCOUNTER
Pt called in again regarding his results  He says he has his phone in his hand now  He is requesting a call back

## 2022-04-27 NOTE — TELEPHONE ENCOUNTER
I spoke with pt regarding blood work  Discussed hbg decreased but possibly dehydration but electrolytes WNL's  He states bleeding has stopped but diarrhea continues  He states he will drop off stool studies tomorrow  He would like to schedule the colonoscopy & if things change will cancel    Pancho Rodriguez

## 2022-04-28 ENCOUNTER — APPOINTMENT (OUTPATIENT)
Dept: LAB | Facility: CLINIC | Age: 32
End: 2022-04-28

## 2022-04-28 PROCEDURE — 87505 NFCT AGENT DETECTION GI: CPT

## 2022-04-28 PROCEDURE — 87493 C DIFF AMPLIFIED PROBE: CPT

## 2022-04-28 PROCEDURE — 87177 OVA AND PARASITES SMEARS: CPT

## 2022-04-28 PROCEDURE — 83993 ASSAY FOR CALPROTECTIN FECAL: CPT

## 2022-04-28 PROCEDURE — 87338 HPYLORI STOOL AG IA: CPT

## 2022-04-28 PROCEDURE — 87209 SMEAR COMPLEX STAIN: CPT

## 2022-04-28 PROCEDURE — 87329 GIARDIA AG IA: CPT

## 2022-04-29 ENCOUNTER — TELEPHONE (OUTPATIENT)
Dept: GASTROENTEROLOGY | Facility: CLINIC | Age: 32
End: 2022-04-29

## 2022-04-29 LAB
C DIFF TOX GENS STL QL NAA+PROBE: NEGATIVE
CAMPYLOBACTER DNA SPEC NAA+PROBE: NORMAL
G LAMBLIA AG STL QL IA: NEGATIVE
H PYLORI AG STL QL IA: POSITIVE
SALMONELLA DNA SPEC QL NAA+PROBE: NORMAL
SHIGA TOXIN STX GENE SPEC NAA+PROBE: NORMAL
SHIGELLA DNA SPEC QL NAA+PROBE: NORMAL

## 2022-04-29 NOTE — TELEPHONE ENCOUNTER
Patient was called same day (4/27) by the GI PA on [employer], Crystal Birch, and made aware of his results  Remainder of patient's labs including stool studies are collected and in process  Will call patient once resulted

## 2022-04-29 NOTE — TELEPHONE ENCOUNTER
Regarding: Question regarding Comprehensive Metabolic Panel  ----- Message from Odalys Lindo RN sent at 4/27/2022  9:32 AM EDT -----       ----- Message from Radha Wilkins to Dylan Davidson PA-C sent at 4/27/2022  9:31 AM -----   Will I be receiving a phone call in regards to these results?

## 2022-04-30 ENCOUNTER — NURSE TRIAGE (OUTPATIENT)
Dept: OTHER | Facility: OTHER | Age: 32
End: 2022-04-30

## 2022-04-30 ENCOUNTER — TELEPHONE (OUTPATIENT)
Dept: GASTROENTEROLOGY | Facility: AMBULARY SURGERY CENTER | Age: 32
End: 2022-04-30

## 2022-04-30 NOTE — TELEPHONE ENCOUNTER
Regarding: H pylori / antibiotics request   ----- Message from [facility] sent at 4/30/2022 12:27 PM EDT -----  "I came back positive for H pylori and need antibiotics prescription"

## 2022-04-30 NOTE — TELEPHONE ENCOUNTER
Reason for Disposition   [1] Caller requesting NON-URGENT health information AND [2] PCP's office is the best resource    Answer Assessment - Initial Assessment Questions  1  REASON FOR CALL or QUESTION: "What is your reason for calling today?" or "How can I best help you?" or "What question do you have that I can help answer?"      Patient saw he was positive for H  Pylori and would like antibiotics  Protocols used: INFORMATION ONLY CALL - NO TRIAGE-ADULT-    Paged on call gastro provider  Provider will call patient directly to discuss

## 2022-04-30 NOTE — TELEPHONE ENCOUNTER
Reason for Disposition  Chaz Hawkins Caller has already spoken with another triager or PCP AND has further questions AND triager able to answer questions      Protocols used: NO CONTACT OR DUPLICATE CONTACT CALL-ADULT-

## 2022-04-30 NOTE — TELEPHONE ENCOUNTER
Called and spoke to patient regarding H pylori results NSAID over triple therapy  Patient was extremely enthusiastically to obtain this treatment as he believes it would be a cure to his diarrhea  After further chart review, it appears patient's hemoglobin has dropped significantly within the past few weeks as well as his albumin decreasing  Concern for underlying possible inflammatory process in the setting of his persistent diarrhea, blood in the stool, weight loss  He does report weakness and I urged patient to go to the emergency room however patient does not have insurance therefore he would not like to go yet  Fecal calprotectin is still pending  I discussed that if patient was admitted to the hospital he could undergo colonoscopy sooner as he reports his symptoms are very significant and is having bowel movements at least several times up to 10 a day  Again urged patient to go to the emergency room however he will hold off at this time  Patient however is agreeable to go if his symptoms become any worse

## 2022-04-30 NOTE — TELEPHONE ENCOUNTER
Regarding: H pylori / antibiotics request   ----- Message from [facility] sent at 4/30/2022  4:01 PM EDT -----  "I came back positive for H pylori and need antibiotics prescription" (second call hub, patient missed call from nurse)

## 2022-05-01 ENCOUNTER — TELEPHONE (OUTPATIENT)
Dept: OTHER | Facility: OTHER | Age: 32
End: 2022-05-01

## 2022-05-01 LAB — CALPROTECTIN STL-MCNT: 1092 UG/G (ref 0–120)

## 2022-05-02 NOTE — TELEPHONE ENCOUNTER
I returned this call to the patient and left a message on his VM that he had already spoken to James Mario PA-C yesterday and to please follow her care advise or he can call the office in the morning with further questions

## 2022-05-02 NOTE — TELEPHONE ENCOUNTER
Patient would like a call to discuss the results of his stool testing, he just wants to know if the results are something alarming   Please advise

## 2022-05-03 ENCOUNTER — TELEPHONE (OUTPATIENT)
Dept: GASTROENTEROLOGY | Facility: CLINIC | Age: 32
End: 2022-05-03

## 2022-05-04 ENCOUNTER — TELEPHONE (OUTPATIENT)
Dept: OTHER | Facility: OTHER | Age: 32
End: 2022-05-04

## 2022-05-09 ENCOUNTER — ANESTHESIA EVENT (OUTPATIENT)
Dept: GASTROENTEROLOGY | Facility: HOSPITAL | Age: 32
End: 2022-05-09

## 2022-05-09 ENCOUNTER — HOSPITAL ENCOUNTER (OUTPATIENT)
Dept: GASTROENTEROLOGY | Facility: HOSPITAL | Age: 32
Setting detail: OUTPATIENT SURGERY
Discharge: HOME | End: 2022-05-09
Payer: COMMERCIAL

## 2022-05-09 ENCOUNTER — ANESTHESIA (OUTPATIENT)
Dept: GASTROENTEROLOGY | Facility: HOSPITAL | Age: 32
End: 2022-05-09

## 2022-05-09 VITALS
BODY MASS INDEX: 19.93 KG/M2 | TEMPERATURE: 98.8 F | DIASTOLIC BLOOD PRESSURE: 72 MMHG | HEART RATE: 76 BPM | HEIGHT: 66 IN | OXYGEN SATURATION: 100 % | RESPIRATION RATE: 18 BRPM | WEIGHT: 124 LBS | SYSTOLIC BLOOD PRESSURE: 118 MMHG

## 2022-05-09 DIAGNOSIS — K62.5 RECTAL BLEEDING: ICD-10-CM

## 2022-05-09 DIAGNOSIS — R19.7 DIARRHEA, UNSPECIFIED TYPE: ICD-10-CM

## 2022-05-09 DIAGNOSIS — R10.84 GENERALIZED ABDOMINAL PAIN: ICD-10-CM

## 2022-05-09 DIAGNOSIS — K51.00 UNIVERSAL ULCERATIVE COLITIS WITHOUT COMPLICATION (HCC): Primary | ICD-10-CM

## 2022-05-09 PROBLEM — K21.9 GASTROESOPHAGEAL REFLUX DISEASE: Status: ACTIVE | Noted: 2022-05-09

## 2022-05-09 PROBLEM — F17.200 SMOKING: Status: ACTIVE | Noted: 2022-05-09

## 2022-05-09 PROCEDURE — 45380 COLONOSCOPY AND BIOPSY: CPT | Performed by: INTERNAL MEDICINE

## 2022-05-09 PROCEDURE — 88305 TISSUE EXAM BY PATHOLOGIST: CPT | Performed by: PATHOLOGY

## 2022-05-09 RX ORDER — PROPOFOL 10 MG/ML
INJECTION, EMULSION INTRAVENOUS AS NEEDED
Status: DISCONTINUED | OUTPATIENT
Start: 2022-05-09 | End: 2022-05-09

## 2022-05-09 RX ORDER — SODIUM CHLORIDE, SODIUM LACTATE, POTASSIUM CHLORIDE, CALCIUM CHLORIDE 600; 310; 30; 20 MG/100ML; MG/100ML; MG/100ML; MG/100ML
75 INJECTION, SOLUTION INTRAVENOUS CONTINUOUS
Status: DISCONTINUED | OUTPATIENT
Start: 2022-05-09 | End: 2022-05-13 | Stop reason: HOSPADM

## 2022-05-09 RX ORDER — SULFASALAZINE 500 MG/1
500 TABLET, DELAYED RELEASE ORAL 4 TIMES DAILY
Qty: 120 TABLET | Refills: 12 | Status: SHIPPED | OUTPATIENT
Start: 2022-05-09 | End: 2022-07-21

## 2022-05-09 RX ORDER — PREDNISONE 20 MG/1
TABLET ORAL
Qty: 90 TABLET | Refills: 0 | Status: SHIPPED | OUTPATIENT
Start: 2022-05-09 | End: 2022-06-17

## 2022-05-09 RX ORDER — MESALAMINE 1.2 G/1
1200 TABLET, DELAYED RELEASE ORAL
Qty: 60 TABLET | Refills: 12 | Status: SHIPPED | OUTPATIENT
Start: 2022-05-09 | End: 2022-06-29

## 2022-05-09 RX ADMIN — PROPOFOL 130 MG: 10 INJECTION, EMULSION INTRAVENOUS at 11:21

## 2022-05-09 RX ADMIN — PROPOFOL 30 MG: 10 INJECTION, EMULSION INTRAVENOUS at 11:26

## 2022-05-09 RX ADMIN — SODIUM CHLORIDE, SODIUM LACTATE, POTASSIUM CHLORIDE, AND CALCIUM CHLORIDE 75 ML/HR: .6; .31; .03; .02 INJECTION, SOLUTION INTRAVENOUS at 10:30

## 2022-05-09 NOTE — ANESTHESIA PREPROCEDURE EVALUATION
Procedure:  COLONOSCOPY    Relevant Problems   GI/HEPATIC   (+) Gastroesophageal reflux disease      PULMONARY   (+) Smoking        Physical Exam    Airway    Mallampati score: II  TM Distance: >3 FB  Neck ROM: full     Dental   No notable dental hx     Cardiovascular      Pulmonary      Other Findings        Anesthesia Plan  ASA Score- 2     Anesthesia Type- IV sedation with anesthesia with ASA Monitors  Additional Monitors:   Airway Plan:     Comment: Back up GA  Plan Factors-Exercise tolerance (METS): >4 METS  Chart reviewed  Existing labs reviewed  Patient summary reviewed  Patient is a current smoker  Induction-     Postoperative Plan-     Informed Consent- Anesthetic plan and risks discussed with patient  I personally reviewed this patient with the CRNA  Discussed and agreed on the Anesthesia Plan with the CRNA  Oskar Sanchez

## 2022-05-09 NOTE — H&P
History and Physical -  [facility]  Kaya Mattson 27 y o  male MRN: [id number]                  HPI: Kaya Mattson is a 27y o  year old male who presents for diarrhea and rectal bleeding      REVIEW OF SYSTEMS: Per the HPI, and otherwise unremarkable  Historical Information   Past Medical History:   Diagnosis Date    GERD (gastroesophageal reflux disease)      Past Surgical History:   Procedure Laterality Date    WISDOM TOOTH EXTRACTION       Social History   Social History     Substance and Sexual Activity   Alcohol Use Never     Social History     Substance and Sexual Activity   Drug Use Yes    Types: Marijuana    Comment: 3x per week     Social History     Tobacco Use   Smoking Status Current Some Day Smoker    Types: Cigarettes   Smokeless Tobacco Never Used     History reviewed  No pertinent family history  Meds/Allergies       Current Outpatient Medications:     amoxicillin (AMOXIL) 500 mg capsule    clarithromycin (BIAXIN) 500 mg tablet    dicyclomine (BENTYL) 10 mg capsule    pantoprazole (PROTONIX) 40 mg tablet    Current Facility-Administered Medications:     lactated ringers infusion, 75 mL/hr, Intravenous, Continuous, 75 mL/hr at 05/09/22 1030    No Known Allergies    Objective     BP 95/54   Pulse 72   Temp 97 6 °F (36 4 °C) (Temporal)   Resp 16   Ht 5' 6" (1 676 m)   Wt 56 2 kg (124 lb)   SpO2 100%   BMI 20 01 kg/m²       PHYSICAL EXAM    Gen: NAD  Head: NCAT  CV: RRR  CHEST: Clear  ABD: soft, NT/ND  EXT: no edema      ASSESSMENT/PLAN:  This is a 27y o  year old male here for colonoscopy, and he is stable and optimized for his procedure

## 2022-05-09 NOTE — ANESTHESIA POSTPROCEDURE EVALUATION
Post-Op Assessment Note    CV Status:  Stable  Pain Score: 0    Pain management: adequate     Mental Status:  Alert and awake   Hydration Status:  Euvolemic   PONV Controlled:  Controlled   Airway Patency:  Patent      Post Op Vitals Reviewed: Yes      Staff: Anesthesiologist, CRNA         No complications documented      BP   105/65   Temp      Pulse  60   Resp   16   SpO2   99

## 2022-05-09 NOTE — DISCHARGE INSTRUCTIONS
Ulcerative Colitis   WHAT YOU NEED TO KNOW:   What is ulcerative colitis? Ulcerative colitis is a chronic disease of the colon (large intestine)  Inflammation and ulcers form on the inner lining of your colon  Ulcerative colitis is a type of inflammatory bowel disease  It usually starts between the ages of 13and 27years old  What are the signs and symptoms of ulcerative colitis? · Diarrhea that may have mucus or blood    · Bleeding from your rectum    · Urgent bowel movements    · Abdominal tenderness and bloating    · Fever, poor appetite, weight loss, fatigue    How is ulcerative colitis diagnosed? · A rectal exam  may show blood  Your healthcare provider will put a gloved finger inside your rectum  He or she will feel for inflammation or blockage  · A CT or MRI  may show a blockage, an abscess, inflammation, or abnormal connection  You may be given contrast liquid to help your colon show up better in the pictures  Tell the healthcare provider if you have ever had an allergic reaction to contrast liquid  Do not enter the MRI room with anything metal  Metal can cause serious injury  Tell the healthcare provider if you have any metal in or on your body  · Endoscopy  is a procedure used to find the cause of your ulcerative colitis  An endoscope is a bendable tube with a light and camera on the end  A small sample of tissue and bowel movement may be removed  These are sent to a lab for testing  How is ulcerative colitis treated? · Medicines  may be given to help decrease inflammation or control your immune system  You may need to take more than 1 medicine to treat your ulcerative colitis  · Surgery  may be needed to remove part or all of your colon  Ask about the different kinds of surgery that can be done to help your symptoms  How can I manage my ulcerative colitis? · Do not take NSAID medicines , including aspirin and ibuprofen  NSAIDs can cause flare-ups      · Eat a variety of healthy foods  to keep your colon healthy  Healthy foods include fruit, vegetables, whole-grain breads, low-fat dairy products, beans, lean meat, and fish  Do not eat foods that make your symptoms worse  Your healthcare provider may give you vitamins or minerals to improve your nutrition if you have severe ulcerative colitis  · Drink liquids as directed  Ask how much liquid to drink each day and which liquids are best for you  For most people, water, juice, and milk are good choices  Do not drink alcohol  This can make your symptoms worse  · Exercise regularly  Ask about the best exercise plan for you  Any activity is better than none  Even 10 minutes a few times a day would help prevent constipation and help keep your colon healthy  · Manage stress  Stress may slow healing and cause illness  Learn new ways to relax, such as deep breathing  Call, or have someone call, your local emergency number (911 in the [address] [facility]) if:   · You have sudden trouble breathing  · You have a fast heart rate, fast breathing, or are too dizzy to stand up  When should I seek immediate care? · You have severe abdominal pain  · Your vomit has blood in it or looks like coffee grounds  · You see blood in your bowel movement  When should I call my doctor? · You have a fever, chills, a cough, or feel weak and achy  · You have abdominal pain that does not go away or gets worse after you take medicine  · Your abdomen is swollen  · You lose weight without trying  · You have questions or concerns about your condition or care  CARE AGREEMENT:   You have the right to help plan your care  Learn about your health condition and how it may be treated  Discuss treatment options with your healthcare providers to decide what care you want to receive  You always have the right to refuse treatment  The above information is an  only   It is not intended as medical advice for individual conditions or treatments  Talk to your doctor, nurse or pharmacist before following any medical regimen to see if it is safe and effective for you  © Copyright [facility] 2022 Information is for End User's use only and may not be sold, redistributed or otherwise used for commercial purposes   All illustrations and images included in CareNotes® are the copyrighted property of A D A M , [employer]  or [address] [place] [address]

## 2022-05-20 ENCOUNTER — OFFICE VISIT (OUTPATIENT)
Dept: GASTROENTEROLOGY | Facility: CLINIC | Age: 32
End: 2022-05-20
Payer: COMMERCIAL

## 2022-05-20 VITALS
DIASTOLIC BLOOD PRESSURE: 68 MMHG | WEIGHT: 117 LBS | OXYGEN SATURATION: 98 % | TEMPERATURE: 98.5 F | HEART RATE: 79 BPM | SYSTOLIC BLOOD PRESSURE: 108 MMHG | RESPIRATION RATE: 14 BRPM | BODY MASS INDEX: 18.8 KG/M2 | HEIGHT: 66 IN

## 2022-05-20 DIAGNOSIS — K51.00 ULCERATIVE PANCOLITIS WITHOUT COMPLICATION (HCC): Primary | ICD-10-CM

## 2022-05-20 PROCEDURE — 99213 OFFICE O/P EST LOW 20 MIN: CPT | Performed by: INTERNAL MEDICINE

## 2022-05-20 RX ORDER — PREDNISONE 1 MG/1
5 TABLET ORAL DAILY
Qty: 30 TABLET | Refills: 1 | Status: SHIPPED | OUTPATIENT
Start: 2022-05-20 | End: 2022-06-16 | Stop reason: SDUPTHER

## 2022-05-20 NOTE — PROGRESS NOTES
[facility] - Outpatient Follow-up Note  Shyam Drake 27 y o  male MRN: [id number]  Encounter: [id number]          ASSESSMENT AND PLAN:      1  Ulcerative pancolitis without complication [facility])    This is a 27-year-old white male with ulcerative colitis  I will have him continue the prednisone at his current dose until next week and then start tapering him off the prednisone  I will see him again in 2-3 weeks  Thank you so much for referring this patient     ______________________________________________________________________    SUBJECTIVE:  Evan Stewart returns to the office for follow-up after being diagnosed with pan ulcerative colitis  He was started on prednisone and has had significant improvement of his symptoms  REVIEW OF SYSTEMS IS OTHERWISE NEGATIVE  Historical Information   Past Medical History:   Diagnosis Date    GERD (gastroesophageal reflux disease)      Past Surgical History:   Procedure Laterality Date    COLONOSCOPY  05/09/2022    WISDOM TOOTH EXTRACTION       Social History   Social History     Substance and Sexual Activity   Alcohol Use Never     Social History     Substance and Sexual Activity   Drug Use Yes    Types: Marijuana    Comment: 3x per week     Social History     Tobacco Use   Smoking Status Current Some Day Smoker    Types: Cigarettes   Smokeless Tobacco Never Used     No family history on file  Meds/Allergies       Current Outpatient Medications:     dicyclomine (BENTYL) 10 mg capsule    mesalamine (LIALDA) 1 2 g EC tablet    pantoprazole (PROTONIX) 40 mg tablet    predniSONE 20 mg tablet    sulfaSALAzine (AZULFIDINE-EN) 500 mg EC tablet    No Known Allergies        Objective     Blood pressure 108/68, pulse 79, temperature 98 5 °F (36 9 °C), temperature source Tympanic, resp  rate 14, height 5' 6" (1 676 m), weight 53 1 kg (117 lb), SpO2 98 %  Body mass index is 18 88 kg/m²        PHYSICAL EXAM:      General Appearance:   Alert, cooperative, no distress   HEENT:   Normocephalic, atraumatic, anicteric  Neck:  Supple, symmetrical, trachea midline   Lungs:   Clear to auscultation bilaterally; no rales, rhonchi or wheezing; respirations unlabored    Heart[de-identified]   Regular rate and rhythm; no murmur, rub, or gallop  Abdomen:   Soft, non-tender, non-distended; normal bowel sounds; no masses, no organomegaly    Genitalia:   Deferred    Rectal:   Deferred    Extremities:  No cyanosis, clubbing or edema    Pulses:  2+ and symmetric    Skin:  No jaundice, rashes, or lesions    Lymph nodes:  No palpable cervical lymphadenopathy        Lab Results:   No visits with results within 1 Day(s) from this visit  Latest known visit with results is:   Hospital Outpatient Visit on 05/09/2022   Component Date Value    Case Report 05/09/2022                      Value:Surgical Pathology Report                         Case: [id number]                                   Authorizing Provider:  Mireya Haider MD          Collected:           05/09/2022 1127              Ordering Location:     [facility] Received:            05/09/2022 1147                                     Endoscopy                                                                    Pathologist:           Idris Delacruz MD                                                                  Specimens:   A) - Large Intestine, Cecum, cecal bx r/o ulcerative colitis                                        B) - Large Intestine, Left/Descending Colon, bx r/o ulcerative colitis                     Final Diagnosis 05/09/2022                      Value: This result contains rich text formatting which cannot be displayed here   Additional Information 05/09/2022                      Value: This result contains rich text formatting which cannot be displayed here  Oriana Hernandes Gross Description 05/09/2022                      Value: This result contains rich text formatting which cannot be displayed here  Radiology Results:   Colonoscopy    Result Date: 5/9/2022  Narrative: [place] [facility] [id number] [phone] DATE OF SERVICE: 5/09/22 PHYSICIAN(S): Attending: Stiven Flores MD Fellow: No Staff Documented INDICATION: Rectal bleeding, Generalized abdominal pain, Diarrhea, unspecified type POST-OP DIAGNOSIS: See the impression below  HISTORY: Prior colonoscopy: No prior colonoscopy  BOWEL PREPARATION: Miralax/Dulcolax PREPROCEDURE: Informed consent was obtained for the procedure, including sedation  Risks including but not limited to bleeding, infection, perforation, adverse drug reaction and aspiration were explained in detail  Also explained about less than 100% sensitivity with the exam and other alternatives  The patient was placed in the left lateral decubitus position  DETAILS OF PROCEDURE: Patient was taken to the procedure room where a time out was performed to confirm correct patient and correct procedure  The patient underwent monitored anesthesia care, which was administered by an anesthesia professional  The patient's blood pressure, heart rate, level of consciousness, oxygen and respirations were monitored throughout the procedure  A digital rectal exam was performed  The scope was introduced through the anus and advanced to the cecum  Retroflexion was performed in the rectum  The quality of bowel preparation was evaluated using the [facility] Bowel Preparation Scale with scores of: right colon = 2, transverse colon = 2, left colon = 2  The total BBPS score was 6  Bowel prep was adequate  The patient experienced no blood loss  The procedure was not difficult  The patient tolerated the procedure well  There were no apparent complications   ANESTHESIA INFORMATION: ASA: II Anesthesia Type: IV Sedation with Anesthesia MEDICATIONS: No administrations occurring from 1118 to 1134 on 05/09/22 FINDINGS: The cecum appeared normal  Moderate pancolonic friable mucosa with erosion, loss of folds and loss of vascular pattern, consistent with ulcerative colitis; performed cold forceps biopsy in the cecum and descending colon to rule out IBD EVENTS: Procedure Events Event Event Time ENDO CECUM REACHED 5/9/2022 11:26 AM ENDO SCOPE OUT TIME 5/9/2022 11:31 AM SPECIMENS: ID Type Source Tests Collected by Time Destination 1 : cecal bx r/o ulcerative colitis Tissue Large Intestine, Cecum TISSUE EXAM Mireya Haider MD 5/9/2022 11:27 AM  2 : bx r/o ulcerative colitis Tissue Large Intestine, Left/Descending Colon TISSUE EXAM Mierya Haider MD 5/9/2022 11:30 AM  EQUIPMENT: Colonoscope -     Impression: The cecum appeared normal  Moderate pancolonic friable mucosa with erosion, loss of folds and loss of vascular pattern, consistent with ulcerative colitis; performed cold forceps biopsy to rule out IBD RECOMMENDATION: Await pathology results Repeat colonoscopy in 7 years due to inflammatory bowel disease Prednisone 60 mg p o  daily Asacol HD Follow-up in the office in two weeks  Mireya Haider MD

## 2022-06-07 ENCOUNTER — TELEPHONE (OUTPATIENT)
Dept: OTHER | Facility: OTHER | Age: 32
End: 2022-06-07

## 2022-06-07 ENCOUNTER — TELEPHONE (OUTPATIENT)
Dept: PULMONOLOGY | Facility: CLINIC | Age: 32
End: 2022-06-07

## 2022-06-07 NOTE — TELEPHONE ENCOUNTER
Jhony Peña ([facility]) [phone] (H)     Is requesting a call to schedule a follow-up appointment for a follow-up appointment

## 2022-06-07 NOTE — TELEPHONE ENCOUNTER
Patient calling stating "My anxiety is not good from my procedure and I'm having a hard time sleeping " Please advise

## 2022-06-14 ENCOUNTER — TELEPHONE (OUTPATIENT)
Dept: PSYCHIATRY | Facility: CLINIC | Age: 32
End: 2022-06-14

## 2022-06-14 ENCOUNTER — OFFICE VISIT (OUTPATIENT)
Dept: GASTROENTEROLOGY | Facility: CLINIC | Age: 32
End: 2022-06-14
Payer: COMMERCIAL

## 2022-06-14 VITALS
BODY MASS INDEX: 19.51 KG/M2 | SYSTOLIC BLOOD PRESSURE: 110 MMHG | HEART RATE: 91 BPM | DIASTOLIC BLOOD PRESSURE: 52 MMHG | OXYGEN SATURATION: 99 % | RESPIRATION RATE: 16 BRPM | HEIGHT: 66 IN | TEMPERATURE: 98.4 F | WEIGHT: 121.4 LBS

## 2022-06-14 DIAGNOSIS — F41.9 ANXIETY: ICD-10-CM

## 2022-06-14 DIAGNOSIS — K51.00 ULCERATIVE (CHRONIC) PANCOLITIS WITHOUT COMPLICATIONS (HCC): Primary | ICD-10-CM

## 2022-06-14 PROCEDURE — 99213 OFFICE O/P EST LOW 20 MIN: CPT | Performed by: INTERNAL MEDICINE

## 2022-06-14 NOTE — PROGRESS NOTES
[facility] - Outpatient Follow-up Note  Marry Situ 27 y o  male MRN: [id number]  Encounter: [id number]          ASSESSMENT AND PLAN:      1  Ulcerative (chronic) pancolitis without complications ([facility] )    This is a 39-year-old white male with pan ulcerative colitis currently on prednisone 25 mg daily  For now I would continue him on 25 mg for one additional week and then start to taper him again by 5 mg every third day  Additionally I have told him to avoid any milk products for now  I will see him again in two weeks  He would also like a referral to mental health   ______________________________________________________________________    SUBJECTIVEGeraldine Rosy returns for follow-up  He is doing better but he still having some diarrhea  It is not every day but may be related to lactose intake  Currently he is on prednisone 25 mg daily and overall he is doing much better  REVIEW OF SYSTEMS IS OTHERWISE NEGATIVE  Historical Information   Past Medical History:   Diagnosis Date    GERD (gastroesophageal reflux disease)      Past Surgical History:   Procedure Laterality Date    COLONOSCOPY  05/09/2022    WISDOM TOOTH EXTRACTION       Social History   Social History     Substance and Sexual Activity   Alcohol Use Never     Social History     Substance and Sexual Activity   Drug Use Yes    Types: Marijuana    Comment: 3x per week     Social History     Tobacco Use   Smoking Status Current Some Day Smoker    Types: Cigarettes, Cigarettes   Smokeless Tobacco Never Used     History reviewed  No pertinent family history      Meds/Allergies       Current Outpatient Medications:     predniSONE 5 mg tablet    sulfaSALAzine (AZULFIDINE-EN) 500 mg EC tablet    dicyclomine (BENTYL) 10 mg capsule    mesalamine (LIALDA) 1 2 g EC tablet    pantoprazole (PROTONIX) 40 mg tablet    predniSONE 20 mg tablet    No Known Allergies        Objective     Blood pressure 110/52, pulse 91, temperature 98 4 °F (36 9 °C), temperature source Temporal, resp  rate 16, height 5' 6" (1 676 m), weight 55 1 kg (121 lb 6 4 oz), SpO2 99 %  Body mass index is 19 59 kg/m²  PHYSICAL EXAM:      General Appearance:   Alert, cooperative, no distress   HEENT:   Normocephalic, atraumatic, anicteric  Neck:  Supple, symmetrical, trachea midline   Lungs:   Clear to auscultation bilaterally; no rales, rhonchi or wheezing; respirations unlabored    Heart[de-identified]   Regular rate and rhythm; no murmur, rub, or gallop  Abdomen:   Soft, non-tender, non-distended; normal bowel sounds; no masses, no organomegaly    Genitalia:   Deferred    Rectal:   Deferred    Extremities:  No cyanosis, clubbing or edema    Pulses:  2+ and symmetric    Skin:  No jaundice, rashes, or lesions    Lymph nodes:  No palpable cervical lymphadenopathy        Lab Results:   No visits with results within 1 Day(s) from this visit  Latest known visit with results is:   Hospital Outpatient Visit on 05/09/2022   Component Date Value    Case Report 05/09/2022                      Value:Surgical Pathology Report                         Case: [id number]                                   Authorizing Provider:  Liyah Hastings MD          Collected:           05/09/2022 1127              Ordering Location:     [facility] Received:            05/09/2022 1147                                     Endoscopy                                                                    Pathologist:           Buster Wooten MD                                                                  Specimens:   A) - Large Intestine, Cecum, cecal bx r/o ulcerative colitis                                        B) - Large Intestine, Left/Descending Colon, bx r/o ulcerative colitis                     Final Diagnosis 05/09/2022                      Value: This result contains rich text formatting which cannot be displayed here      Additional Information 05/09/2022 Value:This result contains rich text formatting which cannot be displayed here  Stephanie Mena Gross Description 05/09/2022                      Value: This result contains rich text formatting which cannot be displayed here  Radiology Results:   No results found

## 2022-06-14 NOTE — TELEPHONE ENCOUNTER
Pt was calling to set up services  He would like talk therapy and med mgmt for an evaluation  Yes to vv for the talk therapy  Has referral in the system  He is self pay

## 2022-06-29 ENCOUNTER — APPOINTMENT (OUTPATIENT)
Dept: LAB | Facility: CLINIC | Age: 32
End: 2022-06-29
Payer: COMMERCIAL

## 2022-06-29 ENCOUNTER — OFFICE VISIT (OUTPATIENT)
Dept: GASTROENTEROLOGY | Facility: CLINIC | Age: 32
End: 2022-06-29
Payer: COMMERCIAL

## 2022-06-29 VITALS
RESPIRATION RATE: 17 BRPM | TEMPERATURE: 99.6 F | HEIGHT: 66 IN | SYSTOLIC BLOOD PRESSURE: 110 MMHG | HEART RATE: 82 BPM | DIASTOLIC BLOOD PRESSURE: 70 MMHG | WEIGHT: 122 LBS | BODY MASS INDEX: 19.61 KG/M2 | OXYGEN SATURATION: 98 %

## 2022-06-29 DIAGNOSIS — Z79.899 MEDICATION MANAGEMENT: ICD-10-CM

## 2022-06-29 DIAGNOSIS — A04.8 H. PYLORI INFECTION: ICD-10-CM

## 2022-06-29 DIAGNOSIS — K51.00 ULCERATIVE PANCOLITIS WITHOUT COMPLICATION (HCC): ICD-10-CM

## 2022-06-29 DIAGNOSIS — Z72.0 TOBACCO USE: ICD-10-CM

## 2022-06-29 DIAGNOSIS — E73.9 LACTOSE INTOLERANCE: ICD-10-CM

## 2022-06-29 DIAGNOSIS — K51.00 ULCERATIVE PANCOLITIS WITHOUT COMPLICATION (HCC): Primary | ICD-10-CM

## 2022-06-29 LAB
ALBUMIN SERPL BCP-MCNC: 4 G/DL (ref 3.5–5)
ALP SERPL-CCNC: 55 U/L (ref 46–116)
ALT SERPL W P-5'-P-CCNC: 30 U/L (ref 12–78)
ANION GAP SERPL CALCULATED.3IONS-SCNC: 7 MMOL/L (ref 4–13)
AST SERPL W P-5'-P-CCNC: 16 U/L (ref 5–45)
BASOPHILS # BLD AUTO: 0.03 THOUSANDS/ΜL (ref 0–0.1)
BASOPHILS NFR BLD AUTO: 0 % (ref 0–1)
BILIRUB SERPL-MCNC: 0.31 MG/DL (ref 0.2–1)
BUN SERPL-MCNC: 10 MG/DL (ref 5–25)
CALCIUM SERPL-MCNC: 9.2 MG/DL (ref 8.3–10.1)
CHLORIDE SERPL-SCNC: 104 MMOL/L (ref 100–108)
CO2 SERPL-SCNC: 27 MMOL/L (ref 21–32)
CREAT SERPL-MCNC: 0.82 MG/DL (ref 0.6–1.3)
EOSINOPHIL # BLD AUTO: 0.01 THOUSAND/ΜL (ref 0–0.61)
EOSINOPHIL NFR BLD AUTO: 0 % (ref 0–6)
ERYTHROCYTE [DISTWIDTH] IN BLOOD BY AUTOMATED COUNT: 17.4 % (ref 11.6–15.1)
GFR SERPL CREATININE-BSD FRML MDRD: 117 ML/MIN/1.73SQ M
GLUCOSE SERPL-MCNC: 97 MG/DL (ref 65–140)
HCT VFR BLD AUTO: 34.9 % (ref 36.5–49.3)
HGB BLD-MCNC: 10 G/DL (ref 12–17)
IMM GRANULOCYTES # BLD AUTO: 0.06 THOUSAND/UL (ref 0–0.2)
IMM GRANULOCYTES NFR BLD AUTO: 1 % (ref 0–2)
LYMPHOCYTES # BLD AUTO: 1.27 THOUSANDS/ΜL (ref 0.6–4.47)
LYMPHOCYTES NFR BLD AUTO: 12 % (ref 14–44)
MCH RBC QN AUTO: 21.2 PG (ref 26.8–34.3)
MCHC RBC AUTO-ENTMCNC: 28.7 G/DL (ref 31.4–37.4)
MCV RBC AUTO: 74 FL (ref 82–98)
MONOCYTES # BLD AUTO: 0.58 THOUSAND/ΜL (ref 0.17–1.22)
MONOCYTES NFR BLD AUTO: 6 % (ref 4–12)
NEUTROPHILS # BLD AUTO: 8.34 THOUSANDS/ΜL (ref 1.85–7.62)
NEUTS SEG NFR BLD AUTO: 81 % (ref 43–75)
NRBC BLD AUTO-RTO: 0 /100 WBCS
PLATELET # BLD AUTO: 522 THOUSANDS/UL (ref 149–390)
PMV BLD AUTO: 9.8 FL (ref 8.9–12.7)
POTASSIUM SERPL-SCNC: 4.3 MMOL/L (ref 3.5–5.3)
PROT SERPL-MCNC: 7.7 G/DL (ref 6.4–8.2)
RBC # BLD AUTO: 4.71 MILLION/UL (ref 3.88–5.62)
SODIUM SERPL-SCNC: 138 MMOL/L (ref 136–145)
WBC # BLD AUTO: 10.29 THOUSAND/UL (ref 4.31–10.16)

## 2022-06-29 PROCEDURE — 85025 COMPLETE CBC W/AUTO DIFF WBC: CPT

## 2022-06-29 PROCEDURE — 80053 COMPREHEN METABOLIC PANEL: CPT

## 2022-06-29 PROCEDURE — 36415 COLL VENOUS BLD VENIPUNCTURE: CPT

## 2022-06-29 PROCEDURE — 99214 OFFICE O/P EST MOD 30 MIN: CPT | Performed by: FAMILY MEDICINE

## 2022-06-29 RX ORDER — MESALAMINE 1.2 G/1
1200 TABLET, DELAYED RELEASE ORAL
Qty: 30 TABLET | Refills: 0 | Status: SHIPPED | OUTPATIENT
Start: 2022-06-29 | End: 2022-06-29

## 2022-06-29 RX ORDER — PREDNISONE 1 MG/1
TABLET ORAL
Qty: 100 TABLET | Refills: 0 | Status: SHIPPED | OUTPATIENT
Start: 2022-06-29 | End: 2022-07-21 | Stop reason: SDUPTHER

## 2022-06-29 NOTE — PROGRESS NOTES
[facility] - Outpatient Follow-up Note  Dayne Luna 27 y o  male MRN: [id number]  Encounter: [id number]          ASSESSMENT AND PLAN:      1  Ulcerative pancolitis without complication ([facility] )  2  Medication management  Patient initially seen for generalized abdominal pain and diarrhea with rectal bleeding  Stool studies noted elevated fecal calprotectin (>1000)  Colonoscopy on 5/9 with biopsies confirmed ulcerative pancolitis with mild-moderate activity  Currently taking sulfasalazine 500 mg 4x daily and prednisone 20 mg once daily  Attempted prednisone taper by 5 mg every 3 days, but with rectal bleeding after 15 mg daily  Recommended patient continue taking sulfasalazine 500 mg 4x daily and will attempt an extended prednisone taper as follows: Begin taking 4 tabs (20 mg) once daily x7 days to taper by 1 tab (5 mg) once weekly  Instructed that if he were to develop worsening symptoms to resume previous dose and call the office for further instructions  Ultimately, would prefer patient to be on mesalamine (Lialda) for better symptom control but may not be financially feasible given he is uninsured - Encouraged to discuss estimated cost of mesalamine (Lialda) with his pharmacist  If cost is reasonable, will transition patient from sulfasalazine to mesalamine (Lialda)  Also ordered CBC and CMP given use of current medications  - predniSONE 5 mg tablet; Begin taking 4 tabs (20mg) once daily x7 days to taper by 1 tab (5 mg) once weekly  Dispense: 100 tablet; Refill: 0  - Comprehensive metabolic panel; Future  - CBC and differential; Future    3  H  pylori infection  Patient with positive H pylori stool antigen on 04/28/2022  Patient has completed triple therapy (amoxicillin, clarithromycin, and pantoprazole), without confirmation of eradication  He has been off PPI therapy for >2 weeks, therefore ordered repeat H pylori stool antigen to complete at his earliest convenience   Discussed that if he were to be positive, he would require additional treatment with quadruple therapy    - H  pylori antigen, stool; Future    4  Lactose intolerance  Patient has noticed that he has significant GI upset with consumption of lactose containing products  Recommended he try taking over-the-counter Lactaid before consuming dairy products or continue with a strict dairy free diet  5  Tobacco use  Strongly encouraged complete tobacco cessation for overall health, especially given recent diagnosis of ulcerative colitis  Follow-up in 4 weeks  ______________________________________________________________________    SUBJECTIVE: Patient is a 27 y o  male with PMH significant for current tobacco use who presents today for follow-up regarding recent diagnosis of ulcerative pancolitis  Patient was initially seen on 04/21/2022 for generalized abdominal pain and diarrhea with rectal bleeding  Stool studies revealed patient to be positive for H pylori - Prescribed triple therapy treatment which patient has completed in its entirety, no confirmation of eradication  Patient was also found to have an elevated fecal calprotectin  Underwent colonoscopy on 05/09/2022 notable for normal appearing cecum; moderate pancolonic friable mucosa with erosion, loss of folds and loss of vascular pattern, consistent with ulcerative colitis s/p biopsy  Biopsies notable for chronic colitis with mild-moderate activity  Patient is currently taking sulfasalazine 500 mg QID and is in the process of a prednisone taper  States he attempted tapering by 5 mg every 3 days as instructed, but will have rectal bleeding once he reaches 15 mg  Otherwise patient states he is doing well and offers no complaints - Admits to having 2-3 formed bowel movements daily  Denies nocturnal stooling or fecal incontinence   Denies all other GI-related complaints, but has noticed that lactose causes him significant abdominal discomfort and diarrhea  REVIEW OF SYSTEMS IS OTHERWISE NEGATIVE  Historical Information   Past Medical History:   Diagnosis Date    GERD (gastroesophageal reflux disease)      Past Surgical History:   Procedure Laterality Date    COLONOSCOPY  05/09/2022    WISDOM TOOTH EXTRACTION       Social History   Social History     Substance and Sexual Activity   Alcohol Use Never     Social History     Substance and Sexual Activity   Drug Use Yes    Types: Marijuana    Comment: 3x per week     Social History     Tobacco Use   Smoking Status Current Some Day Smoker    Types: Cigarettes, Cigarettes   Smokeless Tobacco Never Used     History reviewed  No pertinent family history  Meds/Allergies       Current Outpatient Medications:     predniSONE 5 mg tablet    sulfaSALAzine (AZULFIDINE-EN) 500 mg EC tablet    dicyclomine (BENTYL) 10 mg capsule    pantoprazole (PROTONIX) 40 mg tablet    No Known Allergies        Objective     Blood pressure 110/70, pulse 82, temperature 99 6 °F (37 6 °C), resp  rate 17, height 5' 6" (1 676 m), weight 55 3 kg (122 lb), SpO2 98 %  Body mass index is 19 69 kg/m²  PHYSICAL EXAM:      General Appearance:   Alert, cooperative, no distress   HEENT:   Normocephalic, atraumatic, anicteric  Neck:  Supple, symmetrical, trachea midline   Lungs:   Clear to auscultation bilaterally; no rales, rhonchi or wheezing; respirations unlabored    Heart[de-identified]   Regular rate and rhythm; no murmur, rub, or gallop  Abdomen:   Soft, non-tender, non-distended; normal bowel sounds; no masses, no organomegaly    Genitalia:   Deferred    Rectal:   Deferred    Extremities:  No cyanosis, clubbing or edema    Pulses:  2+ and symmetric    Skin:  No jaundice, rashes, or lesions    Lymph nodes:  No palpable cervical lymphadenopathy        Lab Results:   No visits with results within 1 Day(s) from this visit     Latest known visit with results is:   Hospital Outpatient Visit on 05/09/2022   Component Date Value    Case Report 05/09/2022                      Value:Surgical Pathology Report                         Case: [id number]                                   Authorizing Provider:  Mireya Haider MD          Collected:           05/09/2022 1127              Ordering Location:     [facility] Received:            05/09/2022 1147                                     Endoscopy                                                                    Pathologist:           Idris Delacruz MD                                                                  Specimens:   A) - Large Intestine, Cecum, cecal bx r/o ulcerative colitis                                        B) - Large Intestine, Left/Descending Colon, bx r/o ulcerative colitis                     Final Diagnosis 05/09/2022                      Value: This result contains rich text formatting which cannot be displayed here   Additional Information 05/09/2022                      Value: This result contains rich text formatting which cannot be displayed here  Escalona Gross Description 05/09/2022                      Value: This result contains rich text formatting which cannot be displayed here  Radiology Results:   No results found

## 2022-06-29 NOTE — PATIENT INSTRUCTIONS
- Continue taking sulfasalazine as prescribed  - Taper prednisone as instructed: Begin taking 4 tabs (20 mg) once daily x7 days to taper by 1 tab (5 mg) once weekly  - If you were to develop worsening symptoms (diarrhea, abdominal pain, rectal bleeding etc ) resume prednisone at previous dose and call our office immediately for further instruction   - As discussed, you will determine if price of mesalamine is financially reasonable

## 2022-07-05 ENCOUNTER — TELEPHONE (OUTPATIENT)
Dept: OTHER | Facility: OTHER | Age: 32
End: 2022-07-05

## 2022-07-05 DIAGNOSIS — D64.9 ANEMIA, UNSPECIFIED TYPE: Primary | ICD-10-CM

## 2022-07-05 NOTE — TELEPHONE ENCOUNTER
Spoke with pt about his questions concerning his lab results  Reviewed the labs to be completed & the names of the medications Encouraged to discuss  with his pharmacist  also reviewed plan, If cost is reasonable, will transition patient from sulfasalazine to mesalamine (Lialda)  As discussed at 6/29 OV  Pt verbalized understanding of all information         Next OV w/ Dr Hoa Cooper 7/21/22

## 2022-07-07 ENCOUNTER — APPOINTMENT (OUTPATIENT)
Dept: LAB | Facility: CLINIC | Age: 32
End: 2022-07-07
Payer: COMMERCIAL

## 2022-07-07 DIAGNOSIS — D64.9 ANEMIA, UNSPECIFIED TYPE: ICD-10-CM

## 2022-07-07 DIAGNOSIS — A04.8 H. PYLORI INFECTION: ICD-10-CM

## 2022-07-07 LAB
FERRITIN SERPL-MCNC: 4 NG/ML (ref 8–388)
FOLATE SERPL-MCNC: 18.1 NG/ML (ref 3.1–17.5)
IRON SATN MFR SERPL: 3 % (ref 20–50)
IRON SERPL-MCNC: 12 UG/DL (ref 65–175)
TIBC SERPL-MCNC: 469 UG/DL (ref 250–450)
VIT B12 SERPL-MCNC: 325 PG/ML (ref 100–900)

## 2022-07-07 PROCEDURE — 82607 VITAMIN B-12: CPT

## 2022-07-07 PROCEDURE — 36415 COLL VENOUS BLD VENIPUNCTURE: CPT

## 2022-07-07 PROCEDURE — 83540 ASSAY OF IRON: CPT

## 2022-07-07 PROCEDURE — 82728 ASSAY OF FERRITIN: CPT

## 2022-07-07 PROCEDURE — 87338 HPYLORI STOOL AG IA: CPT

## 2022-07-07 PROCEDURE — 83550 IRON BINDING TEST: CPT

## 2022-07-07 PROCEDURE — 82746 ASSAY OF FOLIC ACID SERUM: CPT

## 2022-07-08 DIAGNOSIS — D50.9 IRON DEFICIENCY ANEMIA, UNSPECIFIED IRON DEFICIENCY ANEMIA TYPE: Primary | ICD-10-CM

## 2022-07-08 LAB — H PYLORI AG STL QL IA: NEGATIVE

## 2022-07-08 RX ORDER — FERROUS SULFATE TAB EC 324 MG (65 MG FE EQUIVALENT) 324 (65 FE) MG
324 TABLET DELAYED RESPONSE ORAL
Qty: 60 TABLET | Refills: 1 | Status: SHIPPED | OUTPATIENT
Start: 2022-07-08 | End: 2022-08-23 | Stop reason: SDUPTHER

## 2022-07-13 ENCOUNTER — TELEPHONE (OUTPATIENT)
Dept: DIABETES SERVICES | Facility: CLINIC | Age: 32
End: 2022-07-13

## 2022-07-13 NOTE — TELEPHONE ENCOUNTER
Pt called this morning to state he was recently told he was iron deficient and started on iron supplement  States he is not noticing blood in his stool however for the past 3-4 days is experiencing diarrhea and cramping   Would like a call back

## 2022-07-14 ENCOUNTER — TELEPHONE (OUTPATIENT)
Dept: OTHER | Facility: OTHER | Age: 32
End: 2022-07-14

## 2022-07-14 NOTE — TELEPHONE ENCOUNTER
Patient called in requesting to speak with provider regarding complications he feels he is having while being weaned off prednisone and blood in stool

## 2022-07-14 NOTE — TELEPHONE ENCOUNTER
Returned patient's call  States he has been having abdominal pain, fecal urgency with soft stools, and again noticing blood in his stools with approx every other bowel movement within the last 1-2 weeks - Describes this as blood on the toilet paper after wiping  He has been tapering prednisone by 5 mg every week, and is scheduled to decrease to 10 mg tomorrow  Given worsening symptoms, particularly rectal bleeding, recommended patient resume taking prednisone 20 mg once daily until his follow-up scheduled with Dr Emma Santamaria on 7/21  Patient also has a repeat CBC and iron panel ordered to have drawn on Tuesday (7/19) in order to discuss at this follow-up  Discussed strict ED/return precautions in regards to symptoms

## 2022-07-15 ENCOUNTER — TELEPHONE (OUTPATIENT)
Dept: OTHER | Facility: OTHER | Age: 32
End: 2022-07-15

## 2022-07-15 NOTE — TELEPHONE ENCOUNTER
Dr Kat Lopez for insurance referral  Pt's coverage is [employer], needs to be submitted through [facility] for gastro specialty  Please call with questions

## 2022-07-19 ENCOUNTER — APPOINTMENT (OUTPATIENT)
Dept: LAB | Facility: CLINIC | Age: 32
End: 2022-07-19
Payer: COMMERCIAL

## 2022-07-19 DIAGNOSIS — D50.9 IRON DEFICIENCY ANEMIA, UNSPECIFIED IRON DEFICIENCY ANEMIA TYPE: ICD-10-CM

## 2022-07-19 LAB
ERYTHROCYTE [DISTWIDTH] IN BLOOD BY AUTOMATED COUNT: 17.7 % (ref 11.6–15.1)
FERRITIN SERPL-MCNC: 4 NG/ML (ref 8–388)
HCT VFR BLD AUTO: 34.9 % (ref 36.5–49.3)
HGB BLD-MCNC: 9.8 G/DL (ref 12–17)
IRON SATN MFR SERPL: 3 % (ref 20–50)
IRON SERPL-MCNC: 12 UG/DL (ref 65–175)
MCH RBC QN AUTO: 19.7 PG (ref 26.8–34.3)
MCHC RBC AUTO-ENTMCNC: 28.1 G/DL (ref 31.4–37.4)
MCV RBC AUTO: 70 FL (ref 82–98)
PLATELET # BLD AUTO: 460 THOUSANDS/UL (ref 149–390)
PMV BLD AUTO: 9.9 FL (ref 8.9–12.7)
RBC # BLD AUTO: 4.98 MILLION/UL (ref 3.88–5.62)
TIBC SERPL-MCNC: 449 UG/DL (ref 250–450)
WBC # BLD AUTO: 9.39 THOUSAND/UL (ref 4.31–10.16)

## 2022-07-19 PROCEDURE — 82728 ASSAY OF FERRITIN: CPT

## 2022-07-19 PROCEDURE — 85027 COMPLETE CBC AUTOMATED: CPT

## 2022-07-19 PROCEDURE — 36415 COLL VENOUS BLD VENIPUNCTURE: CPT

## 2022-07-19 PROCEDURE — 83540 ASSAY OF IRON: CPT

## 2022-07-19 PROCEDURE — 83550 IRON BINDING TEST: CPT

## 2022-07-20 NOTE — TELEPHONE ENCOUNTER
I called PCP office Dr Ruiz Scott and spoke to staff  Pt has never been seen there so there are not able to generate an insurance referral for [facility] through Megan fatima for Pt to contact the office ASAP regarding this info

## 2022-07-21 ENCOUNTER — TELEPHONE (OUTPATIENT)
Dept: GASTROENTEROLOGY | Facility: CLINIC | Age: 32
End: 2022-07-21

## 2022-07-21 ENCOUNTER — OFFICE VISIT (OUTPATIENT)
Dept: GASTROENTEROLOGY | Facility: CLINIC | Age: 32
End: 2022-07-21
Payer: COMMERCIAL

## 2022-07-21 VITALS
RESPIRATION RATE: 18 BRPM | WEIGHT: 122.4 LBS | TEMPERATURE: 97.6 F | SYSTOLIC BLOOD PRESSURE: 110 MMHG | OXYGEN SATURATION: 98 % | BODY MASS INDEX: 19.67 KG/M2 | HEIGHT: 66 IN | HEART RATE: 79 BPM | DIASTOLIC BLOOD PRESSURE: 64 MMHG

## 2022-07-21 DIAGNOSIS — A04.8 H. PYLORI INFECTION: ICD-10-CM

## 2022-07-21 DIAGNOSIS — D50.9 IRON DEFICIENCY ANEMIA, UNSPECIFIED IRON DEFICIENCY ANEMIA TYPE: ICD-10-CM

## 2022-07-21 DIAGNOSIS — K51.00 ULCERATIVE PANCOLITIS WITHOUT COMPLICATION (HCC): Primary | ICD-10-CM

## 2022-07-21 DIAGNOSIS — K51.00 ULCERATIVE PANCOLITIS WITHOUT COMPLICATION (HCC): ICD-10-CM

## 2022-07-21 DIAGNOSIS — Z13.811 ENCOUNTER FOR SCREENING FOR LOWER GASTROINTESTINAL DISORDER: ICD-10-CM

## 2022-07-21 PROCEDURE — 99214 OFFICE O/P EST MOD 30 MIN: CPT | Performed by: STUDENT IN AN ORGANIZED HEALTH CARE EDUCATION/TRAINING PROGRAM

## 2022-07-21 RX ORDER — MESALAMINE 1.2 G/1
2400 TABLET, DELAYED RELEASE ORAL
Qty: 60 TABLET | Refills: 11 | Status: SHIPPED | OUTPATIENT
Start: 2022-07-21 | End: 2022-07-21 | Stop reason: SDUPTHER

## 2022-07-21 RX ORDER — MESALAMINE 0.38 G/1
1500 CAPSULE, EXTENDED RELEASE ORAL DAILY
Qty: 120 CAPSULE | Refills: 11 | Status: CANCELLED | OUTPATIENT
Start: 2022-07-21

## 2022-07-21 RX ORDER — MESALAMINE 1.2 G/1
2400 TABLET, DELAYED RELEASE ORAL
Qty: 60 TABLET | Refills: 11 | Status: SHIPPED | OUTPATIENT
Start: 2022-07-21 | End: 2022-08-18

## 2022-07-21 NOTE — TELEPHONE ENCOUNTER
Please let the patient know that I have sent the mesalamine prescription to his [facility]   If there are any issues, let me know where it should be sent

## 2022-07-21 NOTE — TELEPHONE ENCOUNTER
Pt called and asked why mesalamine was sent to a pharmacy in [facility]  He said he remembers the provider stating that it might be beneficial to get it through them but he does not know how he will pick it up or how it gets covered  He also mentioned that he could have gotten it with the [facility] pharmacy in [facility] but he said someone from our office called and stated that we said he does not live at the address on file so now they won't

## 2022-07-21 NOTE — TELEPHONE ENCOUNTER
Pt was seen in our office,7/21/2022  Pt's listed PCP at the time, Dr Deena [facility] was called  Office failed to call us back stating he was not an established pt in their office  On 7/20/2022, my co worker followed up on the pt's insurance referral he needed for his visit on 7/21/2022  Office then informed her that he was not established so a insurance referral could not be provided  Patient was called immediately after and made aware that since he did not have a PCP and we could not obtain an insurance referral, we would not be able to see him  Pt stated he would still be coming to appt  Pt arrived at appt and wanted to be self pay since we could not get an insurance referral  A $30 copay was collected and an estimate was provided  After the visit, patient stopped at my window  He was given a medical records release for his personal use, per his request  I made him aware that there may be a possible charge fee and he oked  He was given his copay receipt and a copy of what his fee might be  Pt received billing's phone number and customer service's number as well  After appt, I was asked to set up appt with [facility]  I set an appt but it was not good with the pt  When calling the practice back, they stated the insurance called them and switched his PCP to Azeem velasquez  I also reached out to his pharmacy, he called and told me there was an issue, the pharmacy told me that the insurance also reached out to them and everything was cleared up  I called pt back and dicussed with him that his next step is to go to the PCP appt, get established and obtain an insurance referral for gastro before he comes back to see us in the office   No follow up appt can be made in our office until he can have CT with contrast

## 2022-07-21 NOTE — PROGRESS NOTES
[facility] - Outpatient Follow-up Note  Melanie Campuzano 27 y o  male MRN: [id number]  Encounter: [id number]          ASSESSMENT AND PLAN:    65-year-old man with ulcerative pancolitis diagnosed 05/2022 here for follow-up  Symptoms borderline controlled with prednisone 20 mg and sulfasalazine 500 mg q i d  Has been unable to titrate prednisone below 20 mg  Will try mesalamine however I am suspicious that the patient may require biologic for disease control  Treatment has been limited due to lack of insurance and potential plans to relocate  1  [facility]  2  Encounter for screening for lower gastrointestinal disorder  Colonoscopy consistent with ulcerative colitis however patient has not had any evaluation of his small bowel  We discussed the overlap between ulcerative colitis and Crohn's disease   - CT small bowel enterography; Future  - mesalamine (LIALDA) 1 2 g EC tablet; Take 2 tablets (2 4 g total) by mouth daily with breakfast  Dispense: 60 tablet; Refill: 11  -continue prednisone 20 mg daily  After 2 weeks on mesalamine, try decreasing to 15 mg    3  Iron deficiency anemia, unspecified iron deficiency anemia type  Advised patient to resume his iron supplement    4  H  pylori infection  Stool antigen negative after completing treatment    ______________________________________________________________________    SUBJECTIVE:   Starting to get GI upset for past few days  Having 3 BM per day  No longer having nocturnal BM  On prednisone 20 mg  Has been noticing some increased urgency for past week  Seeing light blood in 1/3 of BM  Didn't tolerate 15mg of prednisone due to increased bleeding, BM, pain  Last seen by Antonio Marquez 06/29/2022 for ulcerative pancolitis  He was diagnosed with ulcerative pan colitis in May of 2022  REVIEW OF SYSTEMS IS OTHERWISE NEGATIVE        Historical Information   Past Medical History:   Diagnosis Date  GERD (gastroesophageal reflux disease)      Past Surgical History:   Procedure Laterality Date    COLONOSCOPY  05/09/2022    WISDOM TOOTH EXTRACTION       Social History   Social History     Substance and Sexual Activity   Alcohol Use Never     Social History     Substance and Sexual Activity   Drug Use Yes    Types: Marijuana    Comment: 3x per week     Social History     Tobacco Use   Smoking Status Current Some Day Smoker    Types: Cigarettes, Cigarettes   Smokeless Tobacco Never Used     History reviewed  No pertinent family history  Meds/Allergies       Current Outpatient Medications:     predniSONE 5 mg tablet    sulfaSALAzine (AZULFIDINE-EN) 500 mg EC tablet    dicyclomine (BENTYL) 10 mg capsule    ferrous sulfate 324 (65 Fe) mg    pantoprazole (PROTONIX) 40 mg tablet    No Known Allergies        Objective     Blood pressure 110/64, pulse 79, temperature 97 6 °F (36 4 °C), temperature source Tympanic, resp  rate 18, height 5' 6" (1 676 m), weight 55 5 kg (122 lb 6 4 oz), SpO2 98 %  Body mass index is 19 76 kg/m²  PHYSICAL EXAM:      General Appearance:   Alert, cooperative, no distress   HEENT:   Normocephalic, atraumatic, anicteric  Neck:  Supple, symmetrical, trachea midline   Lungs:   Clear to auscultation bilaterally; no rales, rhonchi or wheezing; respirations unlabored    Heart[de-identified]   Regular rate and rhythm; no murmur, rub, or gallop  Abdomen:   Soft, non-tender, non-distended; normal bowel sounds; no masses, no organomegaly    Genitalia:   Deferred    Rectal:   Deferred    Extremities:  No cyanosis, clubbing or edema    Pulses:  2+ and symmetric    Skin:  No jaundice, rashes, or lesions    Lymph nodes:  No palpable cervical lymphadenopathy        Lab Results:   No visits with results within 1 Day(s) from this visit     Latest known visit with results is:   Appointment on 07/19/2022   Component Date Value    WBC 07/19/2022 9 39     RBC 07/19/2022 4 98     Hemoglobin 07/19/2022 9 8 (A)    Hematocrit 07/19/2022 34 9 (A)    MCV 07/19/2022 70 (A)    MCH 07/19/2022 19 7 (A)    MCHC 07/19/2022 28 1 (A)    RDW 07/19/2022 17 7 (A)    Platelets 92/19/0589 460 (A)    MPV 07/19/2022 9 9     Iron Saturation 07/19/2022 3 (A)    TIBC 07/19/2022 449     Iron 07/19/2022 12 (A)    Ferritin 07/19/2022 4 (A)         Radiology Results:   No results found

## 2022-07-22 RX ORDER — PREDNISONE 1 MG/1
TABLET ORAL
Qty: 100 TABLET | Refills: 0 | Status: SHIPPED | OUTPATIENT
Start: 2022-07-22 | End: 2022-08-03 | Stop reason: SDUPTHER

## 2022-07-26 ENCOUNTER — TELEPHONE (OUTPATIENT)
Dept: GASTROENTEROLOGY | Facility: CLINIC | Age: 32
End: 2022-07-26

## 2022-07-26 NOTE — TELEPHONE ENCOUNTER
Patients GI provider:  Dr Ming Dougherty    Number to return call: [phone]    Reason for call: Ronit Linares from [address] [place] [address] called stating that pt had to pay a $30 co-pay for his OV on 7/21 because there wasn't a referral in for him at the time  She would like to speak to someone about refunded the pt and having it changed so ins can be billed with a 1 time referral number  Member ID number is [id number]  Above is her number       Scheduled procedure/appointment date if applicable: Apt/procedure NA

## 2022-07-28 ENCOUNTER — TELEPHONE (OUTPATIENT)
Dept: GASTROENTEROLOGY | Facility: CLINIC | Age: 32
End: 2022-07-28

## 2022-07-28 NOTE — TELEPHONE ENCOUNTER
Pt's insurance was called  Pt received a one time referral with his [facility]  He can not be seen again until he is established with PCP and obtains a gastro referral from them  Pt to call billing for a self pay refund from 7/21 Gastro visit  Left voice mail for patient with this information and provided him with shania phone number

## 2022-08-01 ENCOUNTER — TELEPHONE (OUTPATIENT)
Dept: GASTROENTEROLOGY | Facility: MEDICAL CENTER | Age: 32
End: 2022-08-01

## 2022-08-01 DIAGNOSIS — K51.00 ULCERATIVE PANCOLITIS WITHOUT COMPLICATION (HCC): ICD-10-CM

## 2022-08-01 DIAGNOSIS — K51.011 ULCERATIVE PANCOLITIS WITH RECTAL BLEEDING (HCC): ICD-10-CM

## 2022-08-01 DIAGNOSIS — Z11.59 NEED FOR HEPATITIS B SCREENING TEST: Primary | ICD-10-CM

## 2022-08-01 NOTE — TELEPHONE ENCOUNTER
Connected with the patient via phone and relayed the provider's communication  Educated the patient on Humira vs  Remicade  He prefers Humira  He is currently in [place] now and plans on travelling to multiple states within the week then he will be home on 8/9  He is in [place] to look for apartments and doesn't have new address yet  He agrees to lab work and awaiting results before starting Humira  I said to call our office as soon as he has his new address and explained that Humira comes cold and needs to stay cold and how he needs to be there once it's dropped off  He is requesting a doctor's note due to missing a concert recently and wanting to get his money back  Emailed lab orders as requested, he will get labs done in [place]  Patient isn't feeling well and c/o of the following sx:  -BMs: 3-4x in the morning D-solid stool  -He is experiencing more blood in the stool within the last 3 days, saw blood clots once and each day the toilet water became red  -RUQ abd pain, intermittent and more pain occurring when having a bm  -Afebrile  -Chills on occasions, I asked him to take temperature when this occurs  -Fatigue  -On Pred 20mg daily and Ferrous Sulf as prescribed    Dr Davy Dotsonestic: Please advise

## 2022-08-01 NOTE — TELEPHONE ENCOUNTER
Received [employer] message from patient that ulcerative colitis symptoms are uncontrolled on 20mg prednisone and mesalamine  Left voicemail on patient's phone to expect a call  In planning to start a biologic agent, he will need to have testing for hepatitis B and tuberculosis  Please ask him to go to the lab to have blood drawn  Please also ask him to drop off a stool sample to confirm inflammation in his colon and that the current treatment is not working  Please also ask him whether he is still planning to move  If he is going to be moving in the next 6 months, I think we should start Humira because he can administer to himself  This will make the transition to a new state easier without worrying about missing doses in transitioning care  If he is going to be staying in the area long term, we can do either Remicadeor Humira depending on his preference of infusion at infusion center vs self administration  After he gets the blood work done and drops off the stool sample, he should increase his prednisone back to 30 mg while we are awaiting biologic approval     Thanks!

## 2022-08-03 RX ORDER — PREDNISONE 1 MG/1
TABLET ORAL
Qty: 100 TABLET | Refills: 0 | Status: SHIPPED | OUTPATIENT
Start: 2022-08-03 | End: 2022-08-29 | Stop reason: SDUPTHER

## 2022-08-03 RX ORDER — PREDNISONE 1 MG/1
TABLET ORAL
Qty: 100 TABLET | Refills: 0 | Status: SHIPPED | OUTPATIENT
Start: 2022-08-03 | End: 2022-08-03

## 2022-08-03 NOTE — TELEPHONE ENCOUNTER
Please let Abbi Half know that to move forward, we need the hepatitis B and TB blood tests and the fecal calprotectin  Once this testing is done, it will take a few weeks to get insurance approval and the medication shipped  Once he has done the testing, he can increase prednisone back to 30 mg a day to hold him over until he starts humira  I'm not comfortable giving a note for not attending a concert unless he was being evaluated in the office that day

## 2022-08-03 NOTE — TELEPHONE ENCOUNTER
Connected with the patient's vm and relayed the provider's communication  I said we will be in contact regarding results and next steps

## 2022-08-03 NOTE — TELEPHONE ENCOUNTER
Patient called me back and said he heard my voice mail  He requested additional Prednisone to [facility] on [address] [facility] , [address], [address], he only has a couple of days left of his current supply  Would you please send the patient in some more Pred? Thanks!

## 2022-08-10 NOTE — TELEPHONE ENCOUNTER
Connected with the patient via phone  Q  Gold and Hep B negative/normal  Calpro elevated indicative of flare and inflammatory coming from intestinal lining  Relayed results to the patient  He hasn't found a place to live in [place] yet so he would like to have Humira go to the address on file  Patient has about 1 week of Mesalamine left, I said we may have a determination with Humira and if you do not hear about the authorization of Humira to please call back on Friday and request a refill  Dr Delfino Acosta: Patient has remained on 30mg of Pred daily  How would you like to taper him? He is feeling better and has decreased blood in stool and decreased diarrhea  He is also asking about a referral to Hemotology for Iron Def  Please advise  Thanks! Luma: Could you work on his [address]? Thanks!

## 2022-08-11 NOTE — TELEPHONE ENCOUNTER
Connected with the patient's vm relaying the provider's response  Dr Loralyn Dance: Pelon Dowell will request you to send in the script once we know which specialty pharmacy it goes to  His Hep B result is under result for devyn pro on 8/2- proved non reactive

## 2022-08-11 NOTE — TELEPHONE ENCOUNTER
Given his elevated fecal calprotectin, he should stay on prednisone 30 mg daily until we have Humira initiated  He should continue taking the iron supplement and does not need to see heme at this time  Getting is UC under control is going to be key to correcting his iron deficiency anemia because he's continuing to lose blood/iron from his colon until this is controlled  I do not see the hepatitis B surface antigen in his chart  Am I not seeing it or do we still need to receive the result  Do you need me to do any orders for the Humira?

## 2022-08-12 NOTE — TELEPHONE ENCOUNTER
Called insurance at [phone] and spoke with Phyllis Arnoldira starter pack plus 40mg biweekly has been approved  Valid 8/11/22 through 8/11/23  #[id number]      Patient needs to fill through [facility]

## 2022-08-12 NOTE — TELEPHONE ENCOUNTER
Dr Kendall- please send medication to [facility]  I have added this to the patients chart

## 2022-08-18 ENCOUNTER — TELEPHONE (OUTPATIENT)
Dept: GASTROENTEROLOGY | Facility: MEDICAL CENTER | Age: 32
End: 2022-08-18

## 2022-08-18 DIAGNOSIS — K51.011 ULCERATIVE PANCOLITIS WITH RECTAL BLEEDING (HCC): ICD-10-CM

## 2022-08-18 RX ORDER — MESALAMINE 1.2 G/1
2400 TABLET, DELAYED RELEASE ORAL
Qty: 60 TABLET | Refills: 2 | Status: SHIPPED | OUTPATIENT
Start: 2022-08-18

## 2022-08-18 NOTE — TELEPHONE ENCOUNTER
Spoke with pt  Relayed information to pt advised by Dr Denisa Holguin  Pt has concerns with staying in a hotel, traveling that he can't travel with humira d/t lack of access of refrigeration (recommendations reviewed on how to maintain proper storage)  Pt has 2 days of mesalamine left  Is he to continue taking that? If so, he needs a refill sent to the [facility] in [place]  Pt was agreeable to Dr Esvni Moss recommendations, questioning why humira was sent to pharmacy if recommended not to take it  Pt reports symptoms of COVID are improving and said it is day 6  Can he resume the humira (repeatedly advised on Dr Esvin Moss recommendations) Pt does not have a PCP when advised to consult them about paxlovid

## 2022-08-18 NOTE — TELEPHONE ENCOUNTER
Mesalamine 2 4 g daily [facility]  Please ask patient to continue this until he has done the first two doses of Humira  He can then stop the mesalamine and try tapering the prednisone by 5 mg every week until off prednisone

## 2022-08-18 NOTE — TELEPHONE ENCOUNTER
He is immunosuppressed because of the prednisone so is at higher risk for COVID complications  He should monitor his and if develops any shortness of breath, unrelenting fevers, or concerning symptoms should get evaluated  He could also talk to his PCP or urgent care about whether he qualifies for Paxlovid  I would like him to wait 2 weeks since diagnosis before starting the humira and only start if his symptoms are improving and he is fever free

## 2022-08-18 NOTE — TELEPHONE ENCOUNTER
While on the phone with pt he notified me that he tested positive for covid on 8/14  He is feeling better now but he did ask if there was anything he should be worried about with his condition  When notifying pt of medication status and any information given by provider regarding covid, please notify pt that his humira was approved and was sent to [place]

## 2022-08-22 DIAGNOSIS — D50.9 IRON DEFICIENCY ANEMIA, UNSPECIFIED IRON DEFICIENCY ANEMIA TYPE: ICD-10-CM

## 2022-08-22 DIAGNOSIS — K51.011 ULCERATIVE PANCOLITIS WITH RECTAL BLEEDING (HCC): ICD-10-CM

## 2022-08-22 NOTE — TELEPHONE ENCOUNTER
Patient calling asking if he needs any testing done since he will be in [place] as of tomorrow 08/23/22 till 08/30/22  He is concerned with his iron level   Please advise

## 2022-08-23 ENCOUNTER — TELEPHONE (OUTPATIENT)
Dept: GASTROENTEROLOGY | Facility: CLINIC | Age: 32
End: 2022-08-23

## 2022-08-23 NOTE — TELEPHONE ENCOUNTER
Pt called and stated that there is a lot of blood in his stool  He would like to know if there are any tests that can be done to have his gut bacteria tested and see specifically what bacteria is in him  He would also like to have his ferritin, dopamine and irina tested along with a stool test as he thinks these tests could also give him the answers he is looking for  He believes that his gut health and the bacteria in it play a big role in his depression and anxiety  Any advice for this pt? Thanks

## 2022-08-23 NOTE — TELEPHONE ENCOUNTER
Spoke with patient  Advised him on the recommendation of Dr Lloyd Browne that if he is feeling light headed or dehydrated to go and get evaluated at the ED  Patient stated understanding  He has an appointment in the office tomorrow 08/24/2022 with Dr Lloyd Browne

## 2022-08-23 NOTE — TELEPHONE ENCOUNTER
Pt called and stated he never received any phone calls regarding this  Pt was notified  He mentioned that he is out of the iron supplement  Requesting for that to be refilled  Pt also requesting for Humira to be sent through [facility] so he can have it before he leaves again  Please route back to me  Pt stated he doesn't really want to deal with anyone else

## 2022-08-24 ENCOUNTER — OFFICE VISIT (OUTPATIENT)
Dept: GASTROENTEROLOGY | Facility: CLINIC | Age: 32
End: 2022-08-24
Payer: COMMERCIAL

## 2022-08-24 ENCOUNTER — TELEPHONE (OUTPATIENT)
Dept: DIABETES SERVICES | Facility: CLINIC | Age: 32
End: 2022-08-24

## 2022-08-24 VITALS
TEMPERATURE: 98.4 F | SYSTOLIC BLOOD PRESSURE: 102 MMHG | OXYGEN SATURATION: 99 % | HEIGHT: 66 IN | RESPIRATION RATE: 17 BRPM | DIASTOLIC BLOOD PRESSURE: 68 MMHG | HEART RATE: 84 BPM | WEIGHT: 116.6 LBS | BODY MASS INDEX: 18.74 KG/M2

## 2022-08-24 DIAGNOSIS — D50.0 IRON DEFICIENCY ANEMIA DUE TO CHRONIC BLOOD LOSS: ICD-10-CM

## 2022-08-24 DIAGNOSIS — K51.011 ULCERATIVE PANCOLITIS WITH RECTAL BLEEDING (HCC): Primary | ICD-10-CM

## 2022-08-24 DIAGNOSIS — F32.A DEPRESSION, UNSPECIFIED DEPRESSION TYPE: ICD-10-CM

## 2022-08-24 DIAGNOSIS — K51.011 ULCERATIVE PANCOLITIS WITH RECTAL BLEEDING (HCC): ICD-10-CM

## 2022-08-24 PROCEDURE — 99214 OFFICE O/P EST MOD 30 MIN: CPT | Performed by: STUDENT IN AN ORGANIZED HEALTH CARE EDUCATION/TRAINING PROGRAM

## 2022-08-24 RX ORDER — FERROUS SULFATE TAB EC 324 MG (65 MG FE EQUIVALENT) 324 (65 FE) MG
324 TABLET DELAYED RESPONSE ORAL
Qty: 60 TABLET | Refills: 1 | Status: SHIPPED | OUTPATIENT
Start: 2022-08-24 | End: 2022-08-24

## 2022-08-24 RX ORDER — FERROUS SULFATE TAB EC 324 MG (65 MG FE EQUIVALENT) 324 (65 FE) MG
324 TABLET DELAYED RESPONSE ORAL
Qty: 60 TABLET | Refills: 11 | Status: SHIPPED | OUTPATIENT
Start: 2022-08-24

## 2022-08-24 NOTE — PROGRESS NOTES
[facility] - Outpatient Follow-up Note  Paolo Ingram 27 y o  male MRN: [id number]  Encounter: [id number]          ASSESSMENT AND PLAN:    31M with recently diagnosed ulcerative pan colitis here for follow up  Was unable to wean prednisone on mesalamine so now awaiting humira initiation  Discussed importance of taking humira continuously to avoid developing antibodies and loss of response  1  Ulcerative pancolitis with rectal bleeding ([address] )  -Start Humira  Gave patient phone number for specialty pharmacy to arrange delivery  After completing first 2 doses of humira, start tapering prednisone from 30 mg daily by 5 mg per week  Once you've done the two doses of Humira, stop the mesalamine  2  Iron deficiency anemia due to chronic blood loss  - ferrous sulfate 324 (65 Fe) mg; Take 1 tablet (324 mg total) by mouth 2 (two) times a day before meals  Dispense: 60 tablet; Refill: 11    3  Depression, unspecified depression type  Reports significant intermittent issues with depression and anxiety  Discussed that new diagnosis of chronic disease like IBD can be psychologically challenging  No contraindication from IBD perspective to starting antidepressant if that is appropriate  Also discussed importance of getting off prednisone as this can have significant psychiatric side effects as well    ______________________________________________________________________    SUBJECTIVE:    Has been nick since last visit  Went back to 30 mg prednisone and things got better  Had COVID 2 weeks ago with some fevers and since then having worse GI symptoms  Has sensation of unsettled feeling in stomach, sometimes pain  Seeing some bright red blood on toilet paper or in toilet bowl  Currently, have 1-2 BM per day  Seeing blood 2-3 times per week  May be moving to [place]  Job still unclear    Last seen by me 07/21/2022 for ulcerative colitis with rectal bleeding    Attempted treatment with mesalamine but still had persistent uncontrolled ulcerative colitis and so insurance approval has been obtained for Humira  Unfortunately developed COVID so has yet to initiate Humira  REVIEW OF SYSTEMS IS OTHERWISE NEGATIVE  Historical Information   Past Medical History:   Diagnosis Date    GERD (gastroesophageal reflux disease)      Past Surgical History:   Procedure Laterality Date    COLONOSCOPY  05/09/2022    WISDOM TOOTH EXTRACTION       Social History   Social History     Substance and Sexual Activity   Alcohol Use Never     Social History     Substance and Sexual Activity   Drug Use Yes    Types: Marijuana    Comment: 3x per week     Social History     Tobacco Use   Smoking Status Current Some Day Smoker    Types: Cigarettes, Cigarettes   Smokeless Tobacco Never Used     History reviewed  No pertinent family history  Meds/Allergies       Current Outpatient Medications:     mesalamine (LIALDA) 1 2 g EC tablet    predniSONE 5 mg tablet    adalimumab (HUMIRA) 40 mg/0 8 mL PSKT    Adalimumab 40 MG/0 8ML PNKT    ferrous sulfate 324 (65 Fe) mg    No Known Allergies        Objective     Blood pressure 102/68, pulse 84, temperature 98 4 °F (36 9 °C), resp  rate 17, height 5' 6" (1 676 m), weight 52 9 kg (116 lb 9 6 oz), SpO2 99 %  Body mass index is 18 82 kg/m²  PHYSICAL EXAM:      General Appearance:   Alert, cooperative, no distress   HEENT:   Normocephalic, atraumatic, anicteric  Neck:  Supple, symmetrical, trachea midline   Lungs:   Clear to auscultation bilaterally; no rales, rhonchi or wheezing; respirations unlabored    Heart[de-identified]   Regular rate and rhythm; no murmur, rub, or gallop     Abdomen:   Soft, non-tender, non-distended; normal bowel sounds; no masses, no organomegaly    Genitalia:   Deferred    Rectal:   Deferred    Extremities:  No cyanosis, clubbing or edema    Pulses:  2+ and symmetric    Skin:  No jaundice, rashes, or lesions    Lymph nodes:  No palpable cervical lymphadenopathy        Lab Results:   No visits with results within 1 Day(s) from this visit  Latest known visit with results is:   Appointment on 07/19/2022   Component Date Value    WBC 07/19/2022 9 39     RBC 07/19/2022 4 98     Hemoglobin 07/19/2022 9 8 (A)    Hematocrit 07/19/2022 34 9 (A)    MCV 07/19/2022 70 (A)    MCH 07/19/2022 19 7 (A)    MCHC 07/19/2022 28 1 (A)    RDW 07/19/2022 17 7 (A)    Platelets 48/03/5623 460 (A)    MPV 07/19/2022 9 9     Iron Saturation 07/19/2022 3 (A)    TIBC 07/19/2022 449     Iron 07/19/2022 12 (A)    Ferritin 07/19/2022 4 (A)         Radiology Results:   No results found

## 2022-08-24 NOTE — TELEPHONE ENCOUNTER
Pt called to state he was just seen, Olegario Parker was ordered but he spoke to the pharmacy and was told the Dr needs to order the starter kit first   Please send to [facility] at his request

## 2022-08-25 DIAGNOSIS — K51.011 ULCERATIVE PANCOLITIS WITH RECTAL BLEEDING (HCC): ICD-10-CM

## 2022-08-29 ENCOUNTER — HOSPITAL ENCOUNTER (OUTPATIENT)
Dept: CT IMAGING | Facility: HOSPITAL | Age: 32
Discharge: HOME | End: 2022-08-29
Attending: STUDENT IN AN ORGANIZED HEALTH CARE EDUCATION/TRAINING PROGRAM
Payer: COMMERCIAL

## 2022-08-29 ENCOUNTER — OFFICE VISIT (OUTPATIENT)
Dept: FAMILY MEDICINE CLINIC | Facility: CLINIC | Age: 32
End: 2022-08-29
Payer: COMMERCIAL

## 2022-08-29 VITALS
SYSTOLIC BLOOD PRESSURE: 102 MMHG | BODY MASS INDEX: 19.16 KG/M2 | TEMPERATURE: 98 F | OXYGEN SATURATION: 98 % | HEART RATE: 61 BPM | WEIGHT: 119.2 LBS | HEIGHT: 66 IN | DIASTOLIC BLOOD PRESSURE: 68 MMHG

## 2022-08-29 DIAGNOSIS — F32.A ANXIETY AND DEPRESSION: Primary | ICD-10-CM

## 2022-08-29 DIAGNOSIS — K51.00 ULCERATIVE (CHRONIC) PANCOLITIS WITHOUT COMPLICATIONS (HCC): ICD-10-CM

## 2022-08-29 DIAGNOSIS — K51.00 ULCERATIVE PANCOLITIS WITHOUT COMPLICATION (HCC): ICD-10-CM

## 2022-08-29 DIAGNOSIS — F41.9 ANXIETY AND DEPRESSION: Primary | ICD-10-CM

## 2022-08-29 DIAGNOSIS — Z13.811 ENCOUNTER FOR SCREENING FOR LOWER GASTROINTESTINAL DISORDER: ICD-10-CM

## 2022-08-29 PROCEDURE — G1004 CDSM NDSC: HCPCS

## 2022-08-29 PROCEDURE — 3725F SCREEN DEPRESSION PERFORMED: CPT | Performed by: PHYSICIAN ASSISTANT

## 2022-08-29 PROCEDURE — 99204 OFFICE O/P NEW MOD 45 MIN: CPT | Performed by: PHYSICIAN ASSISTANT

## 2022-08-29 PROCEDURE — 74177 CT ABD & PELVIS W/CONTRAST: CPT

## 2022-08-29 RX ORDER — PREDNISONE 1 MG/1
TABLET ORAL
Qty: 100 TABLET | Refills: 0 | Status: SHIPPED | OUTPATIENT
Start: 2022-08-29 | End: 2022-09-13

## 2022-08-29 RX ADMIN — IOHEXOL 85 ML: 350 INJECTION, SOLUTION INTRAVENOUS at 14:48

## 2022-08-29 NOTE — LETTER
August 29, 2022     Patient: Rasheeda Graham  YOB: 1990  Date of Visit: 8/29/2022      To Whom it May Concern:    Rasheeda Graham is under my professional care  Deshaun Ellis was seen in my office on 8/29/2022  Please allow him to bring his service dog, Julius Oconnor, to live in apartment  If you have any questions or concerns, please don't hesitate to call           Sincerely,          Clair Neely PA-C        CC: No Recipients

## 2022-08-29 NOTE — PROGRESS NOTES
Assessment/Plan:    Problem List Items Addressed This Visit        Digestive    Ulcerative (chronic) pancolitis without complications ([address] )    Relevant Orders    Ambulatory Referral to Gastroenterology      Other Visit Diagnoses     Anxiety and depression    -  Primary    Relevant Medications    sertraline (ZOLOFT) 50 mg tablet           Diagnoses and all orders for this visit:    Anxiety and depression  -     sertraline (ZOLOFT) 50 mg tablet; Take 1 tablet (50 mg total) by mouth daily    Ulcerative (chronic) pancolitis without complications ([facility] [address] )  -     Ambulatory Referral to Gastroenterology; Future      Pt to call me in 1 month to let me know how he is doing with sertraline  He is going to [place] when back in [place]  He is aware that I cannot Rx out of state, [facility] and [facility] will need to have [facility]  Subjective:      Patient ID: Kalia Ivy is a 27 y o  male  Shelldanny Brunner is here today to establish care as a new patient  He is seeing GI for ulcerative pancolitis  He also complains today of anxiety, has had x many years  He sees a therapist, lives a healthy lifestyle, would like to start a daily medication as he's found that there is nothing else he can try on his own  At times, anxiety keeps him awake at night  He is also requesting a letter to take his dog with him as a service/emotional support dog to his upcoming job in [facility]  The following portions of the patient's history were reviewed and updated as appropriate:   He has a past medical history of GERD (gastroesophageal reflux disease) and Ulcerative colitis with complication ([address] )  ,  does not have any pertinent problems on file  ,   has a past surgical history that includes [place] tooth extraction and Colonoscopy (05/09/2022)  ,  family history includes No Known Problems in his father and mother  ,   reports that he has been smoking  He has never used smokeless tobacco  He reports current drug use  Drug: Marijuana   He reports that he does not drink alcohol ,  has No Known Allergies     Current Outpatient Medications   Medication Sig Dispense Refill    Adalimumab 40 MG/0 8ML PNKT Inject 3 2 mL (160 mg total) under the skin see administration instructions Humira starter kit  Inject 160 mg on day 1  Then inject 80 mg on day 15  Then inject 40 mg every 14 days starting on day 29  4 8 mL 0    ferrous sulfate 324 (65 Fe) mg Take 1 tablet (324 mg total) by mouth 2 (two) times a day before meals 60 tablet 11    mesalamine (LIALDA) 1 2 g EC tablet Take 2 tablets (2 4 g total) by mouth daily with breakfast 60 tablet 2    predniSONE 5 mg tablet Take 4 tabs (20 mg total) once daily x14 days; Taper as instructed 100 tablet 0    sertraline (ZOLOFT) 50 mg tablet Take 1 tablet (50 mg total) by mouth daily 30 tablet 0    adalimumab (HUMIRA) 40 mg/0 8 mL PSKT Inject 0 8 mL (40 mg total) under the skin every 14 (fourteen) days After completing the initial starter doses (Patient not taking: Reported on 8/29/2022) 6 each 3     No current facility-administered medications for this visit  Review of Systems   Constitutional: Negative for activity change, appetite change, chills, diaphoresis, fatigue, fever and unexpected weight change  HENT: Negative for congestion, ear pain, postnasal drip, rhinorrhea, sinus pressure, sinus pain, sneezing, sore throat, tinnitus and voice change  Eyes: Negative for pain, redness and visual disturbance  Respiratory: Negative for cough, chest tightness, shortness of breath and wheezing  Cardiovascular: Negative for chest pain, palpitations and leg swelling  Genitourinary: Negative for difficulty urinating, dysuria, frequency, hematuria and urgency  Musculoskeletal: Negative for arthralgias, back pain, gait problem, joint swelling, myalgias, neck pain and neck stiffness  Skin: Negative for color change, pallor, rash and wound     Neurological: Negative for dizziness, tremors, weakness, light-headedness and headaches  Psychiatric/Behavioral: Positive for sleep disturbance  Negative for dysphoric mood, self-injury and suicidal ideas  The patient is nervous/anxious  Objective:  Vitals:    08/29/22 1117   BP: 102/68   Pulse: 61   Temp: 98 °F (36 7 °C)   SpO2: 98%   Weight: 54 1 kg (119 lb 3 2 oz)   Height: 5' 6" (1 676 m)     Body mass index is 19 24 kg/m²  Physical Exam  Vitals reviewed  Constitutional:       General: He is not in acute distress  Appearance: He is well-developed  He is not diaphoretic  HENT:      Head: Normocephalic and atraumatic  Right Ear: Hearing, tympanic membrane, ear canal and external ear normal       Left Ear: Hearing, tympanic membrane, ear canal and external ear normal       Mouth/Throat:      Pharynx: Uvula midline  No oropharyngeal exudate  Eyes:      General: No scleral icterus  Right eye: No discharge  Left eye: No discharge  Conjunctiva/sclera: Conjunctivae normal    Neck:      Thyroid: No thyromegaly  Vascular: No carotid bruit  Cardiovascular:      Rate and Rhythm: Normal rate and regular rhythm  Heart sounds: Normal heart sounds  No murmur heard  Pulmonary:      Effort: Pulmonary effort is normal  No respiratory distress  Breath sounds: Normal breath sounds  No wheezing  Abdominal:      General: Bowel sounds are normal  There is no distension  Palpations: Abdomen is soft  There is no mass  Tenderness: There is no abdominal tenderness  There is no guarding or rebound  Musculoskeletal:         General: No tenderness  Normal range of motion  Cervical back: Neck supple  Lymphadenopathy:      Cervical: No cervical adenopathy  Skin:     General: Skin is warm and dry  Findings: No erythema or rash  Neurological:      Mental Status: He is alert and oriented to person, place, and time  Psychiatric:         Behavior: Behavior normal          Thought Content:  Thought content normal          Judgment: Judgment normal

## 2022-08-29 NOTE — TELEPHONE ENCOUNTER
Patient called again stating he was at the pharmacy, advised him that the doctor has not responded yet  He verbalized understanding

## 2022-09-02 DIAGNOSIS — K51.011 ULCERATIVE PANCOLITIS WITH RECTAL BLEEDING (HCC): ICD-10-CM

## 2022-09-06 RX ORDER — ADALIMUMAB 40MG/0.8ML
KIT SUBCUTANEOUS
Qty: 6 EACH | Refills: 0 | Status: SHIPPED | OUTPATIENT
Start: 2022-09-06 | End: 2022-09-12

## 2022-09-12 ENCOUNTER — TELEPHONE (OUTPATIENT)
Dept: PULMONOLOGY | Facility: CLINIC | Age: 32
End: 2022-09-12

## 2022-09-12 ENCOUNTER — TELEPHONE (OUTPATIENT)
Dept: GASTROENTEROLOGY | Facility: CLINIC | Age: 32
End: 2022-09-12

## 2022-09-12 DIAGNOSIS — K51.011 ULCERATIVE PANCOLITIS WITH RECTAL BLEEDING (HCC): ICD-10-CM

## 2022-09-12 DIAGNOSIS — K51.011 ULCERATIVE PANCOLITIS WITH RECTAL BLEEDING (HCC): Primary | ICD-10-CM

## 2022-09-12 RX ORDER — ADALIMUMAB 40MG/0.8ML
40 KIT SUBCUTANEOUS
Qty: 6 EACH | Refills: 3 | Status: SHIPPED | OUTPATIENT
Start: 2022-09-12

## 2022-09-12 NOTE — TELEPHONE ENCOUNTER
Called patient and reached voicemail  I asked for a call back to clarify what pharmacy he is filling the medication through

## 2022-09-12 NOTE — TELEPHONE ENCOUNTER
Pt called that his last injection was on Saturday for Humira  He stated Dr Clemente Bryson told him it's imperative that there is no lapse with this medication and he is looking for the next dosage to be sent to his pharmacy  He also mentioned he is still weaning off Prednisone

## 2022-09-12 NOTE — TELEPHONE ENCOUNTER
----- Message from Rissa Kemp sent at 9/12/2022  1:44 PM EDT -----  Regarding: Weaning prednisone and CT  I just got off the phone with them 30 minutes ago   Only a 20 day supply is available

## 2022-09-13 ENCOUNTER — TELEPHONE (OUTPATIENT)
Dept: GASTROENTEROLOGY | Facility: CLINIC | Age: 32
End: 2022-09-13

## 2022-09-13 DIAGNOSIS — K51.011 ULCERATIVE PANCOLITIS WITH RECTAL BLEEDING (HCC): Primary | ICD-10-CM

## 2022-09-13 RX ORDER — PREDNISONE 1 MG/1
TABLET ORAL
Qty: 105 TABLET | Refills: 0 | Status: SHIPPED | OUTPATIENT
Start: 2022-09-13 | End: 2022-10-13 | Stop reason: SDUPTHER

## 2022-09-13 NOTE — TELEPHONE ENCOUNTER
[facility] and [facility]  Medication was delivered on 8/27/2022  There is no issues with authorization or the order  Patient is fine to fill medication       Left vm for patient

## 2022-09-13 NOTE — TELEPHONE ENCOUNTER
----- Message from Saw Acharya sent at 9/13/2022 12:10 PM EDT -----  Regarding: Weaning prednisone and CT  [facility] [address] [facility] [address]is Owusu, [address]  Thank you

## 2022-10-07 ENCOUNTER — TELEPHONE (OUTPATIENT)
Dept: GASTROENTEROLOGY | Facility: CLINIC | Age: 32
End: 2022-10-07

## 2022-10-07 DIAGNOSIS — K51.011 ULCERATIVE PANCOLITIS WITH RECTAL BLEEDING (HCC): Primary | ICD-10-CM

## 2022-10-07 NOTE — TELEPHONE ENCOUNTER
Please call patient and ask him to go back up to 20 mg prednisone daily  If his symptoms do not improve after 3 days of 20 mg, he should increase to 40 mg   I would also like him to go to the lab to check his blood counts, blood inflammatory marker, stool infection and stool inflammatory marker (orders are in)  If he develops severe abdominal pain, fever >100 4, lightheadedness, feels like he's going to pass out, or other concerning symptoms, he should be evaluated in an ED      Please schedule an OV for next week with me or any available provider

## 2022-10-07 NOTE — TELEPHONE ENCOUNTER
Patient called expressing having radiating pain in abdomen  Patient feels bloated  Patient has been passing all blood stool every morning for the past 3 days  The flare up has been lasting 6 days  No fever  Patients appetite has been ok leading up to today  Patient stated he is weaning off the predinisone 10mg  Shelvy Mingle going to 5 mg tomorrow  Patient did Humira today instead of waiting until tomorrow  He did mention going to the ER is an option but is not sure they would be able to do anything for him at the ER

## 2022-10-13 DIAGNOSIS — K51.011 ULCERATIVE PANCOLITIS WITH RECTAL BLEEDING (HCC): ICD-10-CM

## 2022-10-13 NOTE — TELEPHONE ENCOUNTER
Patient called stating he needs a refill on his Prednisone, patient is almost out    He would like it sent to [facility] [address], [address]  Pharmacy is listed in chart    Reminded patient about lab work  He requested the lab slips be emailed to him due to him being in a different state and then they will fax the results  I will fax these to him    Also made patient aware he should have a follow up appointment and offered to make him one however, patient is unsure when he will be back in the state  He will call us when he has a set date of return

## 2022-10-14 RX ORDER — PREDNISONE 1 MG/1
TABLET ORAL
Qty: 105 TABLET | Refills: 0 | Status: SHIPPED | OUTPATIENT
Start: 2022-10-14 | End: 2022-10-25 | Stop reason: SDUPTHER

## 2022-10-14 NOTE — TELEPHONE ENCOUNTER
Spoke to patient in regards to a prednisone prescription refill, Prescription was called into pharmacy and ready for   Patient expressed having an ongoing flair up  Blood in stool every morning, having a hard time leaving the house for an extended period of time due to bowel movement urgency  Advised patient to go to the ER if he is experiencing ongoing flair up symptoms  Patient would like to know what they would do at the ER, I informed him that that was up to the ER doctor  Informed patient Dr Kendall had ordered labs to be completed on 10/7

## 2022-10-25 DIAGNOSIS — K51.011 ULCERATIVE PANCOLITIS WITH RECTAL BLEEDING (HCC): ICD-10-CM

## 2022-10-25 RX ORDER — PREDNISONE 1 MG/1
TABLET ORAL
Qty: 105 TABLET | Refills: 0 | Status: SHIPPED | OUTPATIENT
Start: 2022-10-25 | End: 2022-11-29

## 2022-11-10 DIAGNOSIS — K51.011 ULCERATIVE PANCOLITIS WITH RECTAL BLEEDING (HCC): ICD-10-CM

## 2022-11-10 NOTE — TELEPHONE ENCOUNTER
Patient called office in regards to refill - states he will be in a lot of pain tomorrow if he does not have his refill  Patient states he is currently taking 30mg daily  States he can feel a big difference when decreasing dosage

## 2022-11-11 RX ORDER — PREDNISONE 1 MG/1
TABLET ORAL
Qty: 105 TABLET | Refills: 0 | Status: SHIPPED | OUTPATIENT
Start: 2022-11-11 | End: 2022-12-16

## 2022-11-18 ENCOUNTER — NURSE TRIAGE (OUTPATIENT)
Dept: OTHER | Facility: OTHER | Age: 32
End: 2022-11-18

## 2022-11-18 DIAGNOSIS — F32.A ANXIETY AND DEPRESSION: ICD-10-CM

## 2022-11-18 DIAGNOSIS — F41.9 ANXIETY AND DEPRESSION: ICD-10-CM

## 2022-11-18 NOTE — TELEPHONE ENCOUNTER
Pt needs to [facility] [place] and then he needs to figure out how to have it transferred to [facility]

## 2022-11-18 NOTE — TELEPHONE ENCOUNTER
Reason for Disposition  • Pharmacy calling with prescription question and triager answers question    Answer Assessment - Initial Assessment Questions  1  NAME of MEDICATION: "What medicine are you calling about?"      Sertraline    2  QUESTION: "What is your question?" (e g , medication refill, side effect)      Patient reports that his medication was sent today but to the wrong pharmacy  He would like it sent to the [facility] (not [facility]) in [place], [place]  Informed patient I will send this to the pharmacy for him      Protocols used: MEDICATION QUESTION CALL-ADULT-

## 2022-11-18 NOTE — TELEPHONE ENCOUNTER
Regarding: medication refill issues  ----- Message from Dameon Tipton sent at 11/18/2022  5:09 PM EST -----  Pt called, " I was supposed to have medication sent to the pharmacy  The script was sent but  I am in [place] [facility] due to work  I also called the pharmacy to see if the medication was there so that I can have it transferred to a [facility] in [facility] [place] [facility] but the script was not received   I am going to be out of my medication tomorrow  "    Sertraline 50 mg

## 2022-11-22 ENCOUNTER — OFFICE VISIT (OUTPATIENT)
Dept: URBAN - METROPOLITAN AREA CLINIC 113 | Facility: CLINIC | Age: 32
End: 2022-11-22
Payer: COMMERCIAL

## 2022-11-22 VITALS
HEIGHT: 66 IN | TEMPERATURE: 97.8 F | DIASTOLIC BLOOD PRESSURE: 76 MMHG | HEART RATE: 72 BPM | SYSTOLIC BLOOD PRESSURE: 118 MMHG | WEIGHT: 119 LBS | BODY MASS INDEX: 19.13 KG/M2 | RESPIRATION RATE: 16 BRPM

## 2022-11-22 DIAGNOSIS — K51.90 ULCERATIVE COLITIS: ICD-10-CM

## 2022-11-22 PROCEDURE — 99204 OFFICE O/P NEW MOD 45 MIN: CPT | Performed by: INTERNAL MEDICINE

## 2022-11-22 RX ORDER — ADALIMUMAB 40MG/0.4ML
0.4 ML KIT SUBCUTANEOUS
Status: ACTIVE | COMMUNITY

## 2022-11-22 RX ORDER — PREDNISONE 5 MG/1
1 TABLET TABLET ORAL ONCE A DAY
Status: ACTIVE | COMMUNITY

## 2022-11-22 RX ORDER — FERROUS SULFATE 325(65) MG
1 TABLET TABLET ORAL ONCE A DAY
Status: ACTIVE | COMMUNITY

## 2022-11-22 RX ORDER — BISMUTH SUBSALICYLATE 262MG/15ML
TAKE 1 TABLET DAILY PRN SUSPENSION, ORAL (FINAL DOSE FORM) ORAL
Refills: 0 | Status: ON HOLD | COMMUNITY

## 2022-11-22 RX ORDER — SERTRALINE HYDROCHLORIDE 25 MG/1
1 TABLET TABLET, FILM COATED ORAL ONCE A DAY
Status: ACTIVE | COMMUNITY

## 2022-11-22 RX ORDER — PREDNISONE 10 MG/1
4 TABLETS TABLET ORAL ONCE A DAY
Qty: 120 TABLET | Refills: 6 | OUTPATIENT

## 2022-11-22 NOTE — HPI-TODAY'S VISIT:
Very pleasant 31-year-old man presents with a history of ulcerative colitis that was diagnosed in [place] on colonoscopy as outlined below. . He has had difficulty getting off of prednisone.  He is having multiple stools per day.  As long as he takes prednisone 35 mg a day he does not have rectal bleeding.  He got down to 5 mg of prednisone recently and had multiple stools, fecal incontinence and rectal bleeding.  He also has some epigastric discomfort at that time.  He has been compliant with Humira.  He moved from [place] several months ago.  He needs to establish care with us of course.  No complaints of vomiting.  No hematemesis.  No melena.  Weight is relatively stable.  He has been diagnosed with iron deficiency anemia.  He takes iron once daily when he can remember to take the medication.  He has not had levels yet. . CT enterography August 2022.  Short segment of small bowel intussusception likely normal transient phenomenon.  No signs of active inflammation.  Fatty infiltration of the liver . Labs July 2022.  Hemoglobin 9.8, MCV 70, platelet count 460,000, ferritin 4, Helicobacter pylori stool antigen negative, B12 325, folate 18 . Labs June 2022.  AST 16, ALT 30, albumin 4.0 . Colonoscopy May 2022.  Moderate pancolitis with friable mucosa and erosions.  Loss of vascular pattern.  Consistent with ulcerative colitis.  Cecum was normal biopsies were notable for chronic colitis with moderate activity . April 2022.  Fecal calprotectin 1092, C. difficile negative, Giardia antigen negative.  At that time H. pylori stool antigen was positive.  Stool culture negative . April 2022.  Antitissue transglutaminase negative .

## 2022-11-25 ENCOUNTER — WEB ENCOUNTER (OUTPATIENT)
Dept: URBAN - METROPOLITAN AREA CLINIC 113 | Facility: CLINIC | Age: 32
End: 2022-11-25

## 2022-11-25 RX ORDER — PREDNISONE 10 MG/1
4 TABLETS TABLET ORAL ONCE A DAY
Qty: 120 TABLET | Refills: 6
End: 2023-06-26

## 2022-11-25 RX ORDER — FERROUS SULFATE 325(65) MG
1 TABLET TABLET ORAL ONCE A DAY
Qty: 30 TABLET | Refills: 3

## 2022-12-02 LAB
A/G RATIO: 1.8
ADALIMUMAB AB, IBD: <10
ADALIMUMAB AB, IBD: <10
ADALIMUMAB LEVEL, IBD: 17.3
ALBUMIN: 4.2
ALKALINE PHOSPHATASE: 46
ALT (SGPT): 18
AST (SGOT): 14
BILIRUBIN, TOTAL: 0.3
BUN/CREATININE RATIO: (no result)
BUN: 16
C-REACTIVE PROTEIN, QUANT: <0.2
CALCIUM: 9.5
CARBON DIOXIDE, TOTAL: 31
CHLORIDE: 100
COMMENT: (no result)
CREATININE: 0.89
EGFR: 117
FERRITIN, SERUM: 11
GLOBULIN, TOTAL: 2.4
GLUCOSE: 72
HBSAG SCREEN: (no result)
HEMATOCRIT: 48.6
HEMOGLOBIN: 15.1
HEP A AB, IGM: (no result)
HEP B CORE AB, IGM: (no result)
HEP C VIRUS AB: 0.04
HEPATITIS C ANTIBODY: (no result)
INTERPRETATION: (no result)
MCH: 26.4
MCHC: 31.1
MCV: 84.8
MITOGEN-NIL: >10
MPV: 10.2
NIL: 0.06
PLATELET COUNT: 292
POTASSIUM: 4.1
PROTEIN, TOTAL: 6.6
QUANTIFERON(R)-TB GOLD: NEGATIVE
RDW: 19.2
RED BLOOD CELL COUNT: 5.73
SED RATE BY MODIFIED: 2
SODIUM: 138
TB1-NIL: 0.01
TB2-NIL: 0.01
WHITE BLOOD CELL COUNT: 7.9

## 2022-12-05 ENCOUNTER — TELEPHONE ENCOUNTER (OUTPATIENT)
Dept: URBAN - METROPOLITAN AREA CLINIC 113 | Facility: CLINIC | Age: 32
End: 2022-12-05

## 2022-12-19 ENCOUNTER — OFFICE VISIT (OUTPATIENT)
Dept: URBAN - METROPOLITAN AREA CLINIC 113 | Facility: CLINIC | Age: 32
End: 2022-12-19
Payer: COMMERCIAL

## 2022-12-19 ENCOUNTER — WEB ENCOUNTER (OUTPATIENT)
Dept: URBAN - METROPOLITAN AREA CLINIC 113 | Facility: CLINIC | Age: 32
End: 2022-12-19

## 2022-12-19 VITALS
HEART RATE: 77 BPM | DIASTOLIC BLOOD PRESSURE: 74 MMHG | WEIGHT: 117 LBS | RESPIRATION RATE: 18 BRPM | TEMPERATURE: 98 F | HEIGHT: 66 IN | SYSTOLIC BLOOD PRESSURE: 116 MMHG | BODY MASS INDEX: 18.8 KG/M2

## 2022-12-19 DIAGNOSIS — L29.9 PRURITIS: ICD-10-CM

## 2022-12-19 DIAGNOSIS — K51.90 ULCERATIVE COLITIS: ICD-10-CM

## 2022-12-19 PROBLEM — 279333002 PRURITIC DISORDERS: Status: ACTIVE | Noted: 2022-12-19

## 2022-12-19 PROCEDURE — 99214 OFFICE O/P EST MOD 30 MIN: CPT | Performed by: INTERNAL MEDICINE

## 2022-12-19 RX ORDER — PREDNISONE 5 MG/1
1 TABLET TABLET ORAL ONCE A DAY
Status: ACTIVE | COMMUNITY

## 2022-12-19 RX ORDER — SERTRALINE HYDROCHLORIDE 25 MG/1
1 TABLET TABLET, FILM COATED ORAL ONCE A DAY
Status: ACTIVE | COMMUNITY

## 2022-12-19 RX ORDER — HYDROXYZINE HYDROCHLORIDE 50 MG/1
1 TABLET TABLET, FILM COATED ORAL
Qty: 75 | Refills: 4 | OUTPATIENT
Start: 2022-12-19

## 2022-12-19 RX ORDER — FERROUS SULFATE 325(65) MG
1 TABLET TABLET ORAL ONCE A DAY
Qty: 30 TABLET | Refills: 3 | Status: ACTIVE | COMMUNITY

## 2022-12-19 RX ORDER — BISMUTH SUBSALICYLATE 262MG/15ML
TAKE 1 TABLET DAILY PRN SUSPENSION, ORAL (FINAL DOSE FORM) ORAL
Refills: 0 | Status: ON HOLD | COMMUNITY

## 2022-12-19 RX ORDER — PREDNISONE 10 MG/1
4 TABLETS TABLET ORAL ONCE A DAY
Qty: 120 TABLET | Refills: 6 | Status: ACTIVE | COMMUNITY
End: 2023-06-26

## 2022-12-19 RX ORDER — ADALIMUMAB 40MG/0.4ML
0.4 ML KIT SUBCUTANEOUS
Status: ACTIVE | COMMUNITY

## 2022-12-19 RX ORDER — VEDOLIZUMAB 300 MG/5ML
AS DIRECTED INJECTION, POWDER, LYOPHILIZED, FOR SOLUTION INTRAVENOUS
Qty: 300 | OUTPATIENT
Start: 2022-12-19 | End: 2023-03-19

## 2022-12-19 NOTE — HPI-TODAY'S VISIT:
Very pleasant 31-year-old man presents with a history of ulcerative colitis that was diagnosed in [place] on colonoscopy as outlined below. . He is frustrated.  Still having significant stool frequency.  He has not needed steroids.  He is some jitteriness and some anxiety related to the steroids.  Also some pruritus.  No fever.  No joint aches.  He did not mention rectal bleeding or melena.  We discussed his lab levels in detail. . He has had difficulty getting off of prednisone.  He is having multiple stools per day.  As long as he takes prednisone 35 mg a day he does not have rectal bleeding.  He got down to 5 mg of prednisone recently and had multiple stools, fecal incontinence and rectal bleeding.  He also has some epigastric discomfort at that time.  He has been compliant with Humira.  He moved from [place] several months ago.  No complaints of vomiting.  No hematemesis.  No melena.  Weight is relatively stable.  He has been diagnosed with iron deficiency anemia.  He takes iron once daily when he can remember to take the medication.   . CT enterography August 2022.  Short segment of small bowel intussusception likely normal transient phenomenon.  No signs of active inflammation.  Fatty infiltration of the liver . Labs July 2022.  Hemoglobin 9.8, MCV 70, platelet count 460,000, ferritin 4, Helicobacter pylori stool antigen negative, B12 325, folate 18 . Labs June 2022.  AST 16, ALT 30, albumin 4.0 . Colonoscopy May 2022.  Moderate pancolitis with friable mucosa and erosions.  Loss of vascular pattern.  Consistent with ulcerative colitis.  Cecum was normal biopsies were notable for chronic colitis with moderate activity . April 2022.  Fecal calprotectin 1092, C. difficile negative, Giardia antigen negative.  At that time H. pylori stool antigen was positive.  Stool culture negative . April 2022.  Antitissue transglutaminase negative .

## 2022-12-20 ENCOUNTER — WEB ENCOUNTER (OUTPATIENT)
Dept: URBAN - METROPOLITAN AREA CLINIC 113 | Facility: CLINIC | Age: 32
End: 2022-12-20

## 2023-01-03 ENCOUNTER — TELEPHONE ENCOUNTER (OUTPATIENT)
Dept: URBAN - METROPOLITAN AREA CLINIC 113 | Facility: CLINIC | Age: 33
End: 2023-01-03

## 2023-01-06 ENCOUNTER — LAB OUTSIDE AN ENCOUNTER (OUTPATIENT)
Dept: URBAN - METROPOLITAN AREA CLINIC 113 | Facility: CLINIC | Age: 33
End: 2023-01-06

## 2023-01-06 PROBLEM — 64766004 ULCERATIVE COLITIS: Status: ACTIVE | Noted: 2022-11-22

## 2023-01-10 ENCOUNTER — OFFICE VISIT (OUTPATIENT)
Dept: URBAN - METROPOLITAN AREA CLINIC 112 | Facility: CLINIC | Age: 33
End: 2023-01-10
Payer: COMMERCIAL

## 2023-01-10 VITALS
HEART RATE: 98 BPM | HEIGHT: 66 IN | TEMPERATURE: 98.6 F | DIASTOLIC BLOOD PRESSURE: 70 MMHG | WEIGHT: 125 LBS | RESPIRATION RATE: 18 BRPM | SYSTOLIC BLOOD PRESSURE: 118 MMHG | BODY MASS INDEX: 20.09 KG/M2

## 2023-01-10 DIAGNOSIS — K51.80 CHRONIC PANCOLONIC ULCERATIVE COLITIS: ICD-10-CM

## 2023-01-10 PROCEDURE — 96413 CHEMO IV INFUSION 1 HR: CPT | Performed by: INTERNAL MEDICINE

## 2023-01-10 RX ORDER — VEDOLIZUMAB 300 MG/5ML
AS DIRECTED INJECTION, POWDER, LYOPHILIZED, FOR SOLUTION INTRAVENOUS
Qty: 300 | Status: ACTIVE | COMMUNITY
Start: 2022-12-19 | End: 2023-03-19

## 2023-01-10 RX ORDER — BISMUTH SUBSALICYLATE 262MG/15ML
TAKE 1 TABLET DAILY PRN SUSPENSION, ORAL (FINAL DOSE FORM) ORAL
Refills: 0 | Status: ON HOLD | COMMUNITY

## 2023-01-10 RX ORDER — FERROUS SULFATE 325(65) MG
1 TABLET TABLET ORAL ONCE A DAY
Qty: 30 TABLET | Refills: 3 | Status: ACTIVE | COMMUNITY

## 2023-01-10 RX ORDER — ADALIMUMAB 40MG/0.4ML
0.4 ML KIT SUBCUTANEOUS
Status: ACTIVE | COMMUNITY

## 2023-01-10 RX ORDER — PREDNISONE 5 MG/1
1 TABLET TABLET ORAL ONCE A DAY
Status: ACTIVE | COMMUNITY

## 2023-01-10 RX ORDER — SERTRALINE HYDROCHLORIDE 25 MG/1
1 TABLET TABLET, FILM COATED ORAL ONCE A DAY
Status: ACTIVE | COMMUNITY

## 2023-01-10 RX ORDER — HYDROXYZINE HYDROCHLORIDE 50 MG/1
1 TABLET TABLET, FILM COATED ORAL
Qty: 75 | Refills: 4 | Status: ACTIVE | COMMUNITY
Start: 2022-12-19

## 2023-01-10 RX ORDER — PREDNISONE 10 MG/1
4 TABLETS TABLET ORAL ONCE A DAY
Qty: 120 TABLET | Refills: 6 | Status: ACTIVE | COMMUNITY
End: 2023-06-26

## 2023-01-21 ENCOUNTER — WEB ENCOUNTER (OUTPATIENT)
Dept: URBAN - METROPOLITAN AREA CLINIC 112 | Facility: CLINIC | Age: 33
End: 2023-01-21

## 2023-01-24 ENCOUNTER — TELEPHONE ENCOUNTER (OUTPATIENT)
Dept: URBAN - METROPOLITAN AREA CLINIC 113 | Facility: CLINIC | Age: 33
End: 2023-01-24

## 2023-01-24 ENCOUNTER — OFFICE VISIT (OUTPATIENT)
Dept: URBAN - METROPOLITAN AREA CLINIC 112 | Facility: CLINIC | Age: 33
End: 2023-01-24
Payer: COMMERCIAL

## 2023-01-24 VITALS
HEIGHT: 66 IN | TEMPERATURE: 98.2 F | SYSTOLIC BLOOD PRESSURE: 125 MMHG | RESPIRATION RATE: 18 BRPM | BODY MASS INDEX: 19.77 KG/M2 | HEART RATE: 86 BPM | DIASTOLIC BLOOD PRESSURE: 69 MMHG | WEIGHT: 123 LBS

## 2023-01-24 DIAGNOSIS — K51.80 CHRONIC PANCOLONIC ULCERATIVE COLITIS: ICD-10-CM

## 2023-01-24 PROCEDURE — 96413 CHEMO IV INFUSION 1 HR: CPT | Performed by: INTERNAL MEDICINE

## 2023-01-24 RX ORDER — PREDNISONE 10 MG/1
4 TABLETS TABLET ORAL ONCE A DAY
Qty: 120 TABLET | Refills: 6 | Status: ACTIVE | COMMUNITY
End: 2023-06-26

## 2023-01-24 RX ORDER — PREDNISONE 5 MG/1
1 TABLET TABLET ORAL ONCE A DAY
Status: ACTIVE | COMMUNITY

## 2023-01-24 RX ORDER — ADALIMUMAB 40MG/0.4ML
0.4 ML KIT SUBCUTANEOUS
Status: ACTIVE | COMMUNITY

## 2023-01-24 RX ORDER — BISMUTH SUBSALICYLATE 262MG/15ML
TAKE 1 TABLET DAILY PRN SUSPENSION, ORAL (FINAL DOSE FORM) ORAL
Refills: 0 | Status: ON HOLD | COMMUNITY

## 2023-01-24 RX ORDER — HYDROXYZINE HYDROCHLORIDE 50 MG/1
1 TABLET TABLET, FILM COATED ORAL
Qty: 75 | Refills: 4 | Status: ACTIVE | COMMUNITY
Start: 2022-12-19

## 2023-01-24 RX ORDER — SERTRALINE HYDROCHLORIDE 25 MG/1
1 TABLET TABLET, FILM COATED ORAL ONCE A DAY
Status: ACTIVE | COMMUNITY

## 2023-01-24 RX ORDER — VEDOLIZUMAB 300 MG/5ML
AS DIRECTED INJECTION, POWDER, LYOPHILIZED, FOR SOLUTION INTRAVENOUS
Qty: 300 | Status: ACTIVE | COMMUNITY
Start: 2022-12-19 | End: 2023-03-19

## 2023-01-24 RX ORDER — FERROUS SULFATE 325(65) MG
1 TABLET TABLET ORAL ONCE A DAY
Qty: 30 TABLET | Refills: 3 | Status: ACTIVE | COMMUNITY

## 2023-01-31 ENCOUNTER — WEB ENCOUNTER (OUTPATIENT)
Dept: URBAN - METROPOLITAN AREA CLINIC 113 | Facility: CLINIC | Age: 33
End: 2023-01-31

## 2023-01-31 RX ORDER — PREDNISONE 10 MG/1
2 TABLETS TABLET ORAL ONCE A DAY
Qty: 60 TABLET | Refills: 6 | OUTPATIENT
Start: 2023-02-07 | End: 2023-09-05

## 2023-02-21 ENCOUNTER — OFFICE VISIT (OUTPATIENT)
Dept: URBAN - METROPOLITAN AREA CLINIC 112 | Facility: CLINIC | Age: 33
End: 2023-02-21
Payer: COMMERCIAL

## 2023-02-21 ENCOUNTER — OFFICE VISIT (OUTPATIENT)
Dept: URBAN - METROPOLITAN AREA CLINIC 112 | Facility: CLINIC | Age: 33
End: 2023-02-21

## 2023-02-21 ENCOUNTER — TELEPHONE ENCOUNTER (OUTPATIENT)
Dept: URBAN - METROPOLITAN AREA CLINIC 113 | Facility: CLINIC | Age: 33
End: 2023-02-21

## 2023-02-21 VITALS
HEART RATE: 77 BPM | DIASTOLIC BLOOD PRESSURE: 75 MMHG | RESPIRATION RATE: 16 BRPM | WEIGHT: 124 LBS | SYSTOLIC BLOOD PRESSURE: 127 MMHG | HEIGHT: 66 IN | TEMPERATURE: 98.5 F | BODY MASS INDEX: 19.93 KG/M2

## 2023-02-21 DIAGNOSIS — K51.80 CHRONIC PANCOLONIC ULCERATIVE COLITIS: ICD-10-CM

## 2023-02-21 PROCEDURE — 96413 CHEMO IV INFUSION 1 HR: CPT | Performed by: INTERNAL MEDICINE

## 2023-02-21 RX ORDER — HYDROCORTISONE 25 MG/G
1 APPLICATION CREAM TOPICAL
Qty: 1 UNSPECIFIED | Refills: 6 | OUTPATIENT
Start: 2023-02-22 | End: 2023-09-20

## 2023-02-21 RX ORDER — PREDNISONE 5 MG/1
1 TABLET TABLET ORAL ONCE A DAY
Status: ACTIVE | COMMUNITY

## 2023-02-21 RX ORDER — VEDOLIZUMAB 300 MG/5ML
AS DIRECTED INJECTION, POWDER, LYOPHILIZED, FOR SOLUTION INTRAVENOUS
Qty: 300 | Status: ACTIVE | COMMUNITY
Start: 2022-12-19 | End: 2023-03-19

## 2023-02-21 RX ORDER — HYDROXYZINE HYDROCHLORIDE 50 MG/1
1 TABLET TABLET, FILM COATED ORAL
Qty: 75 | Refills: 4 | Status: ACTIVE | COMMUNITY
Start: 2022-12-19

## 2023-02-21 RX ORDER — SERTRALINE HYDROCHLORIDE 25 MG/1
1 TABLET TABLET, FILM COATED ORAL ONCE A DAY
Status: ACTIVE | COMMUNITY

## 2023-02-21 RX ORDER — PREDNISONE 10 MG/1
4 TABLETS TABLET ORAL ONCE A DAY
Qty: 120 TABLET | Refills: 6 | Status: ACTIVE | COMMUNITY
End: 2023-06-26

## 2023-02-21 RX ORDER — PREDNISONE 10 MG/1
2 TABLETS TABLET ORAL ONCE A DAY
Qty: 60 TABLET | Refills: 6 | Status: ACTIVE | COMMUNITY
Start: 2023-02-07 | End: 2023-09-05

## 2023-02-21 RX ORDER — FERROUS SULFATE 325(65) MG
1 TABLET TABLET ORAL ONCE A DAY
Qty: 30 TABLET | Refills: 3 | Status: ACTIVE | COMMUNITY

## 2023-02-21 RX ORDER — ADALIMUMAB 40MG/0.4ML
0.4 ML KIT SUBCUTANEOUS
Status: ACTIVE | COMMUNITY

## 2023-02-21 RX ORDER — BISMUTH SUBSALICYLATE 262MG/15ML
TAKE 1 TABLET DAILY PRN SUSPENSION, ORAL (FINAL DOSE FORM) ORAL
Refills: 0 | Status: ON HOLD | COMMUNITY

## 2023-02-22 ENCOUNTER — WEB ENCOUNTER (OUTPATIENT)
Dept: URBAN - METROPOLITAN AREA CLINIC 113 | Facility: CLINIC | Age: 33
End: 2023-02-22

## 2023-02-23 ENCOUNTER — WEB ENCOUNTER (OUTPATIENT)
Dept: URBAN - METROPOLITAN AREA CLINIC 113 | Facility: CLINIC | Age: 33
End: 2023-02-23

## 2023-03-06 ENCOUNTER — TELEPHONE ENCOUNTER (OUTPATIENT)
Dept: URBAN - METROPOLITAN AREA CLINIC 113 | Facility: CLINIC | Age: 33
End: 2023-03-06

## 2023-03-06 PROBLEM — 271737000 ANEMIA: Status: ACTIVE | Noted: 2023-03-06

## 2023-03-23 ENCOUNTER — WEB ENCOUNTER (OUTPATIENT)
Dept: URBAN - METROPOLITAN AREA CLINIC 113 | Facility: CLINIC | Age: 33
End: 2023-03-23

## 2023-03-23 ENCOUNTER — OFFICE VISIT (OUTPATIENT)
Dept: URBAN - METROPOLITAN AREA CLINIC 113 | Facility: CLINIC | Age: 33
End: 2023-03-23
Payer: COMMERCIAL

## 2023-03-23 VITALS
WEIGHT: 126 LBS | DIASTOLIC BLOOD PRESSURE: 80 MMHG | BODY MASS INDEX: 20.25 KG/M2 | HEIGHT: 66 IN | SYSTOLIC BLOOD PRESSURE: 124 MMHG | HEART RATE: 65 BPM | TEMPERATURE: 98.6 F | RESPIRATION RATE: 16 BRPM

## 2023-03-23 DIAGNOSIS — K51.90 ULCERATIVE COLITIS: ICD-10-CM

## 2023-03-23 DIAGNOSIS — L29.9 PRURITIS: ICD-10-CM

## 2023-03-23 DIAGNOSIS — D64.9 ANEMIA: ICD-10-CM

## 2023-03-23 PROCEDURE — 99214 OFFICE O/P EST MOD 30 MIN: CPT | Performed by: INTERNAL MEDICINE

## 2023-03-23 RX ORDER — HYDROXYZINE HYDROCHLORIDE 50 MG/1
1 TABLET TABLET, FILM COATED ORAL
Qty: 75 | Refills: 4 | Status: ACTIVE | COMMUNITY
Start: 2022-12-19

## 2023-03-23 RX ORDER — PREDNISONE 10 MG/1
4 TABLETS TABLET ORAL ONCE A DAY
Qty: 120 TABLET | Refills: 6 | Status: ACTIVE | COMMUNITY
End: 2023-06-26

## 2023-03-23 RX ORDER — PREDNISONE 10 MG/1
2 TABLETS TABLET ORAL ONCE A DAY
Qty: 60 TABLET | Refills: 6 | Status: ACTIVE | COMMUNITY
Start: 2023-02-07 | End: 2023-09-05

## 2023-03-23 RX ORDER — ADALIMUMAB 40MG/0.4ML
0.4 ML KIT SUBCUTANEOUS
Status: ACTIVE | COMMUNITY

## 2023-03-23 RX ORDER — HYDROCORTISONE 25 MG/G
1 APPLICATION CREAM TOPICAL
Qty: 1 UNSPECIFIED | Refills: 6 | Status: ACTIVE | COMMUNITY
Start: 2023-02-22 | End: 2023-09-20

## 2023-03-23 RX ORDER — SERTRALINE HYDROCHLORIDE 25 MG/1
1 TABLET TABLET, FILM COATED ORAL ONCE A DAY
Status: ACTIVE | COMMUNITY

## 2023-03-23 RX ORDER — PROMETHAZINE HYDROCHLORIDE 12.5 MG/1
1 TABLET TABLET ORAL
Qty: 45 | Refills: 4 | OUTPATIENT
Start: 2023-03-23 | End: 2023-08-20

## 2023-03-23 RX ORDER — BISMUTH SUBSALICYLATE 262MG/15ML
TAKE 1 TABLET DAILY PRN SUSPENSION, ORAL (FINAL DOSE FORM) ORAL
Refills: 0 | Status: ON HOLD | COMMUNITY

## 2023-03-23 RX ORDER — PREDNISONE 5 MG/1
1 TABLET TABLET ORAL ONCE A DAY
Qty: 30 | Refills: 11 | OUTPATIENT
Start: 2023-03-23 | End: 2024-03-17

## 2023-03-23 RX ORDER — PREDNISONE 5 MG/1
1 TABLET TABLET ORAL ONCE A DAY
Status: ACTIVE | COMMUNITY

## 2023-03-23 RX ORDER — ONDANSETRON 8 MG/1
1 TABLET ON THE TONGUE AND ALLOW TO DISSOLVE  AS NEEDED TABLET, ORALLY DISINTEGRATING ORAL ONCE A DAY
Qty: 30 | OUTPATIENT
Start: 2023-03-23

## 2023-03-23 RX ORDER — FERROUS SULFATE 325(65) MG
1 TABLET TABLET ORAL ONCE A DAY
Qty: 30 TABLET | Refills: 3 | Status: ACTIVE | COMMUNITY

## 2023-03-23 NOTE — HPI-TODAY'S VISIT:
Very pleasant 32-year-old man presents with a history of ulcerative colitis that was diagnosed in [place] on colonoscopy here for follow up . . He is still having symptoms.  Significant episodes of nausea about every 2 weeks.  Associate with epigastric abdominal pain.  Having about 2 stools per day.  Intermittent episodes of bright red blood per rectum.  He thinks possibly due to hemorrhoids.  No reports of dysphagia or significant heartburn symptoms. . Jan 2023 Entyvio initiated. . Dec 2022. Humira disocontinued due to lack of response.   . CT enterography August 2022.  Short segment of small bowel intussusception likely normal transient phenomenon.  No signs of active inflammation.  Fatty infiltration of the liver . Labs July 2022.  Hemoglobin 9.8, MCV 70, platelet count 460,000, ferritin 4, Helicobacter pylori stool antigen negative, B12 325, folate 18 . Labs June 2022.  AST 16, ALT 30, albumin 4.0 . Colonoscopy May 2022.  Moderate pancolitis with friable mucosa and erosions.  Loss of vascular pattern.  Consistent with ulcerative colitis.  Cecum was normal biopsies were notable for chronic colitis with moderate activity . April 2022.  Fecal calprotectin 1092, C. difficile negative, Giardia antigen negative.  At that time H. pylori stool antigen was positive.  Stool culture negative . April 2022.  Antitissue transglutaminase negative

## 2023-03-29 LAB
CALPROTECTIN, FECAL: <5
CLOSTRIDIUM DIFFICILE: (no result)

## 2023-04-21 ENCOUNTER — OFFICE VISIT (OUTPATIENT)
Dept: URBAN - METROPOLITAN AREA CLINIC 112 | Facility: CLINIC | Age: 33
End: 2023-04-21
Payer: COMMERCIAL

## 2023-04-21 ENCOUNTER — WEB ENCOUNTER (OUTPATIENT)
Dept: URBAN - METROPOLITAN AREA CLINIC 112 | Facility: CLINIC | Age: 33
End: 2023-04-21

## 2023-04-21 ENCOUNTER — TELEPHONE ENCOUNTER (OUTPATIENT)
Dept: URBAN - METROPOLITAN AREA CLINIC 113 | Facility: CLINIC | Age: 33
End: 2023-04-21

## 2023-04-21 ENCOUNTER — WEB ENCOUNTER (OUTPATIENT)
Dept: URBAN - METROPOLITAN AREA CLINIC 113 | Facility: CLINIC | Age: 33
End: 2023-04-21

## 2023-04-21 VITALS
SYSTOLIC BLOOD PRESSURE: 115 MMHG | DIASTOLIC BLOOD PRESSURE: 71 MMHG | HEART RATE: 66 BPM | HEIGHT: 66 IN | BODY MASS INDEX: 21.05 KG/M2 | RESPIRATION RATE: 18 BRPM | TEMPERATURE: 98.9 F | WEIGHT: 131 LBS

## 2023-04-21 DIAGNOSIS — K51.80 OTHER ULCERATIVE COLITIS: ICD-10-CM

## 2023-04-21 PROCEDURE — 96413 CHEMO IV INFUSION 1 HR: CPT | Performed by: INTERNAL MEDICINE

## 2023-04-21 RX ORDER — PREDNISONE 10 MG/1
2 TABLETS TABLET ORAL ONCE A DAY
Qty: 60 TABLET | Refills: 6 | Status: ACTIVE | COMMUNITY
Start: 2023-02-07 | End: 2023-09-05

## 2023-04-21 RX ORDER — SERTRALINE HYDROCHLORIDE 25 MG/1
1 TABLET TABLET, FILM COATED ORAL ONCE A DAY
Status: ACTIVE | COMMUNITY

## 2023-04-21 RX ORDER — BISMUTH SUBSALICYLATE 262MG/15ML
TAKE 1 TABLET DAILY PRN SUSPENSION, ORAL (FINAL DOSE FORM) ORAL
Refills: 0 | Status: ON HOLD | COMMUNITY

## 2023-04-21 RX ORDER — PROMETHAZINE HYDROCHLORIDE 12.5 MG/1
1 TABLET TABLET ORAL
Qty: 45 | Refills: 4 | Status: ACTIVE | COMMUNITY
Start: 2023-03-23 | End: 2023-08-20

## 2023-04-21 RX ORDER — ONDANSETRON 8 MG/1
1 TABLET ON THE TONGUE AND ALLOW TO DISSOLVE  AS NEEDED TABLET, ORALLY DISINTEGRATING ORAL ONCE A DAY
Qty: 30 | Status: ACTIVE | COMMUNITY
Start: 2023-03-23

## 2023-04-21 RX ORDER — FERROUS SULFATE 325(65) MG
1 TABLET TABLET ORAL ONCE A DAY
Qty: 30 TABLET | Refills: 3 | Status: ACTIVE | COMMUNITY

## 2023-04-21 RX ORDER — HYDROCORTISONE 25 MG/G
1 APPLICATION CREAM TOPICAL
Qty: 1 UNSPECIFIED | Refills: 6 | Status: ACTIVE | COMMUNITY
Start: 2023-02-22 | End: 2023-09-20

## 2023-04-21 RX ORDER — ADALIMUMAB 40MG/0.4ML
0.4 ML KIT SUBCUTANEOUS
Status: ACTIVE | COMMUNITY

## 2023-04-21 RX ORDER — PREDNISONE 10 MG/1
4 TABLETS TABLET ORAL ONCE A DAY
Qty: 120 TABLET | Refills: 6 | Status: ACTIVE | COMMUNITY
End: 2023-06-26

## 2023-04-21 RX ORDER — PREDNISONE 5 MG/1
1 TABLET TABLET ORAL ONCE A DAY
Status: ACTIVE | COMMUNITY

## 2023-04-21 RX ORDER — PREDNISONE 5 MG/1
1 TABLET TABLET ORAL ONCE A DAY
Qty: 30 | Refills: 11 | Status: ACTIVE | COMMUNITY
Start: 2023-03-23 | End: 2024-03-17

## 2023-04-21 RX ORDER — HYDROXYZINE HYDROCHLORIDE 50 MG/1
1 TABLET TABLET, FILM COATED ORAL
Qty: 75 | Refills: 4 | Status: ACTIVE | COMMUNITY
Start: 2022-12-19

## 2023-05-02 ENCOUNTER — WEB ENCOUNTER (OUTPATIENT)
Dept: URBAN - METROPOLITAN AREA CLINIC 113 | Facility: CLINIC | Age: 33
End: 2023-05-02

## 2023-05-23 ENCOUNTER — WEB ENCOUNTER (OUTPATIENT)
Dept: URBAN - METROPOLITAN AREA CLINIC 113 | Facility: CLINIC | Age: 33
End: 2023-05-23

## 2023-06-06 ENCOUNTER — TELEPHONE ENCOUNTER (OUTPATIENT)
Dept: URBAN - METROPOLITAN AREA CLINIC 113 | Facility: CLINIC | Age: 33
End: 2023-06-06

## 2023-06-06 RX ORDER — PROMETHAZINE HYDROCHLORIDE 12.5 MG/1
1 TABLET TABLET ORAL
Qty: 45 | Refills: 4
Start: 2023-03-23 | End: 2023-11-03

## 2023-06-16 ENCOUNTER — OFFICE VISIT (OUTPATIENT)
Dept: URBAN - METROPOLITAN AREA CLINIC 112 | Facility: CLINIC | Age: 33
End: 2023-06-16
Payer: COMMERCIAL

## 2023-06-16 VITALS
RESPIRATION RATE: 16 BRPM | DIASTOLIC BLOOD PRESSURE: 71 MMHG | TEMPERATURE: 98.5 F | WEIGHT: 124 LBS | HEIGHT: 66 IN | HEART RATE: 62 BPM | BODY MASS INDEX: 19.93 KG/M2 | SYSTOLIC BLOOD PRESSURE: 108 MMHG

## 2023-06-16 DIAGNOSIS — K51.80 CHRONIC PANCOLONIC ULCERATIVE COLITIS: ICD-10-CM

## 2023-06-16 PROCEDURE — 96413 CHEMO IV INFUSION 1 HR: CPT | Performed by: INTERNAL MEDICINE

## 2023-06-16 RX ORDER — HYDROCORTISONE 25 MG/G
1 APPLICATION CREAM TOPICAL
Qty: 1 UNSPECIFIED | Refills: 6 | Status: ACTIVE | COMMUNITY
Start: 2023-02-22 | End: 2023-09-20

## 2023-06-16 RX ORDER — SERTRALINE HYDROCHLORIDE 25 MG/1
1 TABLET TABLET, FILM COATED ORAL ONCE A DAY
Status: ACTIVE | COMMUNITY

## 2023-06-16 RX ORDER — BISMUTH SUBSALICYLATE 262MG/15ML
TAKE 1 TABLET DAILY PRN SUSPENSION, ORAL (FINAL DOSE FORM) ORAL
Refills: 0 | Status: ON HOLD | COMMUNITY

## 2023-06-16 RX ORDER — HYDROXYZINE HYDROCHLORIDE 50 MG/1
1 TABLET TABLET, FILM COATED ORAL
Qty: 75 | Refills: 4 | Status: ACTIVE | COMMUNITY
Start: 2022-12-19

## 2023-06-16 RX ORDER — FERROUS SULFATE 325(65) MG
1 TABLET TABLET ORAL ONCE A DAY
Qty: 30 TABLET | Refills: 3 | Status: ACTIVE | COMMUNITY

## 2023-06-16 RX ORDER — PREDNISONE 10 MG/1
4 TABLETS TABLET ORAL ONCE A DAY
Qty: 120 TABLET | Refills: 6 | Status: ACTIVE | COMMUNITY
End: 2023-06-26

## 2023-06-16 RX ORDER — PREDNISONE 10 MG/1
2 TABLETS TABLET ORAL ONCE A DAY
Qty: 60 TABLET | Refills: 6 | Status: ACTIVE | COMMUNITY
Start: 2023-02-07 | End: 2023-09-05

## 2023-06-16 RX ORDER — PROMETHAZINE HYDROCHLORIDE 12.5 MG/1
1 TABLET TABLET ORAL
Qty: 45 | Refills: 4 | Status: ACTIVE | COMMUNITY
Start: 2023-03-23 | End: 2023-11-03

## 2023-06-16 RX ORDER — ADALIMUMAB 40MG/0.4ML
0.4 ML KIT SUBCUTANEOUS
Status: ACTIVE | COMMUNITY

## 2023-06-16 RX ORDER — PREDNISONE 5 MG/1
1 TABLET TABLET ORAL ONCE A DAY
Status: ACTIVE | COMMUNITY

## 2023-06-16 RX ORDER — ONDANSETRON 8 MG/1
1 TABLET ON THE TONGUE AND ALLOW TO DISSOLVE  AS NEEDED TABLET, ORALLY DISINTEGRATING ORAL ONCE A DAY
Qty: 30 | Status: ACTIVE | COMMUNITY
Start: 2023-03-23

## 2023-06-16 RX ORDER — PREDNISONE 5 MG/1
1 TABLET TABLET ORAL ONCE A DAY
Qty: 30 | Refills: 11 | Status: ACTIVE | COMMUNITY
Start: 2023-03-23 | End: 2024-03-17

## 2023-06-19 ENCOUNTER — DASHBOARD ENCOUNTERS (OUTPATIENT)
Age: 33
End: 2023-06-19

## 2023-06-19 ENCOUNTER — OFFICE VISIT (OUTPATIENT)
Dept: URBAN - METROPOLITAN AREA CLINIC 113 | Facility: CLINIC | Age: 33
End: 2023-06-19

## 2023-06-19 ENCOUNTER — TELEPHONE ENCOUNTER (OUTPATIENT)
Dept: URBAN - METROPOLITAN AREA CLINIC 113 | Facility: CLINIC | Age: 33
End: 2023-06-19

## 2023-06-19 RX ORDER — PROMETHAZINE HYDROCHLORIDE 12.5 MG/1
1 TABLET TABLET ORAL
Qty: 45 | Refills: 4 | Status: ACTIVE | COMMUNITY
Start: 2023-03-23 | End: 2023-11-03

## 2023-06-19 RX ORDER — ADALIMUMAB 40MG/0.4ML
0.4 ML KIT SUBCUTANEOUS
Status: ACTIVE | COMMUNITY

## 2023-06-19 RX ORDER — FERROUS SULFATE 325(65) MG
1 TABLET TABLET ORAL ONCE A DAY
Qty: 30 TABLET | Refills: 3 | Status: ACTIVE | COMMUNITY

## 2023-06-19 RX ORDER — BISMUTH SUBSALICYLATE 262MG/15ML
TAKE 1 TABLET DAILY PRN SUSPENSION, ORAL (FINAL DOSE FORM) ORAL
Refills: 0 | Status: ON HOLD | COMMUNITY

## 2023-06-19 RX ORDER — HYDROXYZINE HYDROCHLORIDE 50 MG/1
1 TABLET TABLET, FILM COATED ORAL
Qty: 75 | Refills: 4 | Status: ACTIVE | COMMUNITY
Start: 2022-12-19

## 2023-06-19 RX ORDER — SERTRALINE HYDROCHLORIDE 25 MG/1
1 TABLET TABLET, FILM COATED ORAL ONCE A DAY
Status: ACTIVE | COMMUNITY

## 2023-06-19 RX ORDER — PREDNISONE 5 MG/1
1 TABLET TABLET ORAL ONCE A DAY
Qty: 30 | Refills: 11 | Status: ACTIVE | COMMUNITY
Start: 2023-03-23 | End: 2024-03-17

## 2023-06-19 RX ORDER — HYDROCORTISONE 25 MG/G
1 APPLICATION CREAM TOPICAL
Qty: 1 UNSPECIFIED | Refills: 6 | Status: ACTIVE | COMMUNITY
Start: 2023-02-22 | End: 2023-09-20

## 2023-06-19 RX ORDER — PREDNISONE 10 MG/1
2 TABLETS TABLET ORAL ONCE A DAY
Qty: 60 TABLET | Refills: 6 | Status: ACTIVE | COMMUNITY
Start: 2023-02-07 | End: 2023-09-05

## 2023-06-19 RX ORDER — ONDANSETRON 8 MG/1
1 TABLET ON THE TONGUE AND ALLOW TO DISSOLVE  AS NEEDED TABLET, ORALLY DISINTEGRATING ORAL ONCE A DAY
Qty: 30 | Status: ACTIVE | COMMUNITY
Start: 2023-03-23

## 2023-06-19 RX ORDER — PREDNISONE 5 MG/1
1 TABLET TABLET ORAL ONCE A DAY
Status: ACTIVE | COMMUNITY

## 2023-06-19 RX ORDER — PREDNISONE 10 MG/1
4 TABLETS TABLET ORAL ONCE A DAY
Qty: 120 TABLET | Refills: 6 | Status: ACTIVE | COMMUNITY
End: 2023-06-26

## 2023-08-09 ENCOUNTER — ERX REFILL RESPONSE (OUTPATIENT)
Dept: URBAN - METROPOLITAN AREA CLINIC 113 | Facility: CLINIC | Age: 33
End: 2023-08-09

## 2023-08-09 RX ORDER — ONDANSETRON 8 MG/1
DISSOLVE 1 TABLET ON THE TONGUE BY MOUTH EVERY DAY AS NEEDED TABLET, ORALLY DISINTEGRATING ORAL
Qty: 30 TABLET | Refills: 11 | OUTPATIENT

## 2023-08-09 RX ORDER — ONDANSETRON 8 MG/1
1 TABLET ON THE TONGUE AND ALLOW TO DISSOLVE  AS NEEDED TABLET, ORALLY DISINTEGRATING ORAL ONCE A DAY
Qty: 30 | OUTPATIENT

## 2023-08-11 ENCOUNTER — OFFICE VISIT (OUTPATIENT)
Dept: URBAN - METROPOLITAN AREA CLINIC 112 | Facility: CLINIC | Age: 33
End: 2023-08-11
Payer: COMMERCIAL

## 2023-08-11 VITALS
RESPIRATION RATE: 16 BRPM | HEIGHT: 66 IN | DIASTOLIC BLOOD PRESSURE: 59 MMHG | SYSTOLIC BLOOD PRESSURE: 103 MMHG | HEART RATE: 63 BPM | WEIGHT: 122 LBS | BODY MASS INDEX: 19.61 KG/M2 | TEMPERATURE: 98.6 F

## 2023-08-11 DIAGNOSIS — K51.80 OTHER ULCERATIVE COLITIS: ICD-10-CM

## 2023-08-11 PROCEDURE — 96413 CHEMO IV INFUSION 1 HR: CPT | Performed by: INTERNAL MEDICINE

## 2023-08-11 RX ORDER — SERTRALINE HYDROCHLORIDE 25 MG/1
1 TABLET TABLET, FILM COATED ORAL ONCE A DAY
Status: ACTIVE | COMMUNITY

## 2023-08-11 RX ORDER — ONDANSETRON 8 MG/1
DISSOLVE 1 TABLET ON THE TONGUE BY MOUTH EVERY DAY AS NEEDED TABLET, ORALLY DISINTEGRATING ORAL
Qty: 30 TABLET | Refills: 11 | Status: ACTIVE | COMMUNITY

## 2023-08-11 RX ORDER — ADALIMUMAB 40MG/0.4ML
0.4 ML KIT SUBCUTANEOUS
Status: ACTIVE | COMMUNITY

## 2023-08-11 RX ORDER — FERROUS SULFATE 325(65) MG
1 TABLET TABLET ORAL ONCE A DAY
Qty: 30 TABLET | Refills: 3 | Status: ACTIVE | COMMUNITY

## 2023-08-11 RX ORDER — PROMETHAZINE HYDROCHLORIDE 12.5 MG/1
1 TABLET TABLET ORAL
Qty: 45 | Refills: 4 | Status: ACTIVE | COMMUNITY
Start: 2023-03-23 | End: 2023-11-03

## 2023-08-11 RX ORDER — HYDROCORTISONE 25 MG/G
1 APPLICATION CREAM TOPICAL
Qty: 1 UNSPECIFIED | Refills: 6 | Status: ACTIVE | COMMUNITY
Start: 2023-02-22 | End: 2023-09-20

## 2023-08-11 RX ORDER — BISMUTH SUBSALICYLATE 262MG/15ML
TAKE 1 TABLET DAILY PRN SUSPENSION, ORAL (FINAL DOSE FORM) ORAL
Refills: 0 | Status: ON HOLD | COMMUNITY

## 2023-08-11 RX ORDER — PREDNISONE 10 MG/1
2 TABLETS TABLET ORAL ONCE A DAY
Qty: 60 TABLET | Refills: 6 | Status: ACTIVE | COMMUNITY
Start: 2023-02-07 | End: 2023-09-05

## 2023-08-11 RX ORDER — PREDNISONE 5 MG/1
1 TABLET TABLET ORAL ONCE A DAY
Qty: 30 | Refills: 11 | Status: ACTIVE | COMMUNITY
Start: 2023-03-23 | End: 2024-03-17

## 2023-08-11 RX ORDER — HYDROXYZINE HYDROCHLORIDE 50 MG/1
1 TABLET TABLET, FILM COATED ORAL
Qty: 75 | Refills: 4 | Status: ACTIVE | COMMUNITY
Start: 2022-12-19

## 2023-08-11 RX ORDER — PREDNISONE 5 MG/1
1 TABLET TABLET ORAL ONCE A DAY
Status: ACTIVE | COMMUNITY

## 2023-08-16 ENCOUNTER — ERX REFILL RESPONSE (OUTPATIENT)
Dept: URBAN - METROPOLITAN AREA CLINIC 113 | Facility: CLINIC | Age: 33
End: 2023-08-16

## 2023-08-16 RX ORDER — ONDANSETRON 8 MG/1
1 TABLET AS NEEDED TABLET, FILM COATED ORAL TWICE DAILY
Qty: 45 | Refills: 3 | OUTPATIENT

## 2023-08-16 RX ORDER — ONDANSETRON 8 MG/1
DISSOLVE 1 TABLET ON THE TONGUE BY MOUTH EVERY DAY AS NEEDED TABLET, ORALLY DISINTEGRATING ORAL
Qty: 30 TABLET | Refills: 11 | OUTPATIENT

## 2023-08-21 ENCOUNTER — ERX REFILL RESPONSE (OUTPATIENT)
Dept: URBAN - METROPOLITAN AREA CLINIC 113 | Facility: CLINIC | Age: 33
End: 2023-08-21

## 2023-08-21 RX ORDER — ONDANSETRON 8 MG/1
1 TABLET AS NEEDED TABLET, FILM COATED ORAL TWICE DAILY
Qty: 45 | Refills: 3 | OUTPATIENT

## 2023-08-21 RX ORDER — ONDANSETRON 8 MG/1
1 TABLET ON THE TONGUE AND ALLOW TO DISSOLVE AS NEEDED TABLET, ORALLY DISINTEGRATING ORAL ONCE A DAY
Qty: 30 | Refills: 11 | OUTPATIENT

## 2023-08-22 ENCOUNTER — ERX REFILL RESPONSE (OUTPATIENT)
Dept: URBAN - METROPOLITAN AREA CLINIC 113 | Facility: CLINIC | Age: 33
End: 2023-08-22

## 2023-08-22 RX ORDER — ONDANSETRON HYDROCHLORIDE 8 MG/1
1 TABLET AS NEEDED TABLET, FILM COATED ORAL ONCE A DAY
Qty: 30 | Refills: 5 | OUTPATIENT

## 2023-08-22 RX ORDER — ONDANSETRON 8 MG/1
1 TABLET ON THE TONGUE AND ALLOW TO DISSOLVE AS NEEDED TABLET, ORALLY DISINTEGRATING ORAL ONCE A DAY
Qty: 30 | Refills: 11 | OUTPATIENT

## 2023-08-22 NOTE — TELEPHONE ENCOUNTER
I have called pt multiple times it goes right to voicemail, I have left 3 messages for pt to call my direct line to schedule this Colonoscopy  I have also sent pt a message via [employer] to call my direct line to schedule  If anyone should take a call from this pt please transfer to my direct line [phone] or any surgery scheduler to get this pt on the schedule   Thank you
Please see telephone note from yesterday!
Pt called in stating he was on his [employer] jessica  And noticed that what he thought was the date for his colonoscopy is a new pt visit for 5 20 22  Pt already had a new pt/Consult with Neli Schulz PA-C on 4 27 22  Pt was supposed to be set up for a colonoscopy right away  Per pt " Neli wanted me to get a colonoscopy this week and I have not heard anything " Pt would like a call back from the office to help schedule him in for a colonoscopy ASAP  Please advise 
Scheduled date of colonoscopy (as of today):5/9/22  Physician performing colonoscopy:Lavern  Location of colonoscopy:[place]  Bowel prep reviewed with patient:Miralax  Instructions reviewed with patient by: Annita  Clearances:  none
 used

## 2023-10-05 ENCOUNTER — TELEPHONE (OUTPATIENT)
Age: 33
End: 2023-10-05

## 2023-10-05 NOTE — TELEPHONE ENCOUNTER
Patients GI provider:  Dr. Julian Luna    Number to return call: ( [phone] ext [phone] )    Reason for call: Pt  RX insurance calling to check stat of prior auth for Adalimumab (Humira Pen) 40 MG/0.8ML PNKT. Please reach back out to insurance they are also check to see if office received fax in reagrds to this.      Scheduled procedure/appointment date if applicable: Apt/procedure

## 2023-10-06 ENCOUNTER — TELEPHONE ENCOUNTER (OUTPATIENT)
Dept: URBAN - METROPOLITAN AREA CLINIC 113 | Facility: CLINIC | Age: 33
End: 2023-10-06

## 2023-10-06 ENCOUNTER — OFFICE VISIT (OUTPATIENT)
Dept: URBAN - METROPOLITAN AREA CLINIC 112 | Facility: CLINIC | Age: 33
End: 2023-10-06
Payer: COMMERCIAL

## 2023-10-06 ENCOUNTER — TELEPHONE (OUTPATIENT)
Dept: ENDOCRINOLOGY | Facility: CLINIC | Age: 33
End: 2023-10-06

## 2023-10-06 VITALS
TEMPERATURE: 98.7 F | BODY MASS INDEX: 19.77 KG/M2 | HEIGHT: 66 IN | HEART RATE: 81 BPM | DIASTOLIC BLOOD PRESSURE: 64 MMHG | WEIGHT: 123 LBS | RESPIRATION RATE: 18 BRPM | SYSTOLIC BLOOD PRESSURE: 115 MMHG

## 2023-10-06 DIAGNOSIS — K51.80 CHRONIC PANCOLONIC ULCERATIVE COLITIS: ICD-10-CM

## 2023-10-06 PROCEDURE — 96413 CHEMO IV INFUSION 1 HR: CPT | Performed by: INTERNAL MEDICINE

## 2023-10-06 RX ORDER — ADALIMUMAB 40MG/0.4ML
0.4 ML KIT SUBCUTANEOUS
Status: ACTIVE | COMMUNITY

## 2023-10-06 RX ORDER — SERTRALINE HYDROCHLORIDE 25 MG/1
1 TABLET TABLET, FILM COATED ORAL ONCE A DAY
Status: ACTIVE | COMMUNITY

## 2023-10-06 RX ORDER — ONDANSETRON HYDROCHLORIDE 8 MG/1
1 TABLET AS NEEDED TABLET, FILM COATED ORAL ONCE A DAY
Qty: 30 | Refills: 5 | Status: ACTIVE | COMMUNITY

## 2023-10-06 RX ORDER — BISMUTH SUBSALICYLATE 262MG/15ML
TAKE 1 TABLET DAILY PRN SUSPENSION, ORAL (FINAL DOSE FORM) ORAL
Refills: 0 | Status: ON HOLD | COMMUNITY

## 2023-10-06 RX ORDER — PROMETHAZINE HYDROCHLORIDE 12.5 MG/1
1 TABLET TABLET ORAL
Qty: 45 | Refills: 4 | Status: ACTIVE | COMMUNITY
Start: 2023-03-23 | End: 2023-11-03

## 2023-10-06 RX ORDER — FERROUS SULFATE 325(65) MG
1 TABLET TABLET ORAL ONCE A DAY
Qty: 30 TABLET | Refills: 3 | Status: ACTIVE | COMMUNITY

## 2023-10-06 RX ORDER — PREDNISONE 5 MG/1
1 TABLET TABLET ORAL ONCE A DAY
Status: ACTIVE | COMMUNITY

## 2023-10-06 RX ORDER — HYDROXYZINE HYDROCHLORIDE 50 MG/1
1 TABLET TABLET, FILM COATED ORAL
Qty: 75 | Refills: 4 | Status: ACTIVE | COMMUNITY
Start: 2022-12-19

## 2023-10-06 RX ORDER — PREDNISONE 5 MG/1
1 TABLET TABLET ORAL ONCE A DAY
Qty: 30 | Refills: 11 | Status: ACTIVE | COMMUNITY
Start: 2023-03-23 | End: 2024-03-17

## 2023-10-06 NOTE — TELEPHONE ENCOUNTER
Submitted on CMM and marked as URGENT    [facility] Case ID: [id number]  Need help?  Call us at [phone]  Status   Sent to [facility]  Drug    Humira Pen 40MG/0.8ML pen-injector kit   Form    [facility] Electronic [place] Form (6014 NCPDP)

## 2023-10-09 NOTE — TELEPHONE ENCOUNTER
[facility] from [facility] for [facility]ts [facility]. Scanned in media. Pt has not responded to call. Patient has not been seen and we do not know if he still resides in [place].

## 2023-10-11 NOTE — TELEPHONE ENCOUNTER
Left another voice mail on cell phone asking for return call re: his Humira and if he is residing in [place] or [place].

## 2023-11-30 ENCOUNTER — TELEPHONE ENCOUNTER (OUTPATIENT)
Dept: URBAN - METROPOLITAN AREA CLINIC 113 | Facility: CLINIC | Age: 33
End: 2023-11-30

## 2023-12-01 ENCOUNTER — OFFICE VISIT (OUTPATIENT)
Dept: URBAN - METROPOLITAN AREA CLINIC 112 | Facility: CLINIC | Age: 33
End: 2023-12-01

## 2023-12-12 ENCOUNTER — OFFICE VISIT (OUTPATIENT)
Dept: URBAN - METROPOLITAN AREA CLINIC 113 | Facility: CLINIC | Age: 33
End: 2023-12-12

## 2024-01-24 ENCOUNTER — TELEPHONE (OUTPATIENT)
Age: 34
End: 2024-01-24

## 2024-01-24 NOTE — TELEPHONE ENCOUNTER
Patients GI provider:  Dr. Kendall    Number to return call: Josias [phone]     Reason for call: [employer] calling for Retro prior auth on patient's Humira. They faxed forms before but never received information.   She is faxing new forms to the office. Please call her with any questions.   FYI; PT lives in [place] and does not need ov or refills from us.  Fax #[phone]    Scheduled procedure/appointment date if applicable: 08/24/2022

## 2024-01-25 NOTE — TELEPHONE ENCOUNTER
Called back and left a message indicating that he is no longer a patient of our and has moved to [place]. I suggested to reach out to his new Gastroenterologist and make this request to them.

## 2024-01-26 NOTE — TELEPHONE ENCOUNTER
Call transferred to me they are faxing over documentation regarding a retro auth for Humira for date of service 8/2022. She will be faxing over documentation in regards to this. The purpose of this is to reimburse Medicaid.